# Patient Record
Sex: FEMALE | Race: WHITE | NOT HISPANIC OR LATINO | Employment: FULL TIME | URBAN - METROPOLITAN AREA
[De-identification: names, ages, dates, MRNs, and addresses within clinical notes are randomized per-mention and may not be internally consistent; named-entity substitution may affect disease eponyms.]

---

## 2023-11-15 ENCOUNTER — APPOINTMENT (OUTPATIENT)
Dept: GENERAL RADIOLOGY | Facility: CLINIC | Age: 23
End: 2023-11-15
Attending: EMERGENCY MEDICINE
Payer: COMMERCIAL

## 2023-11-15 ENCOUNTER — HOSPITAL ENCOUNTER (EMERGENCY)
Facility: CLINIC | Age: 23
Discharge: HOME | End: 2023-11-16
Attending: EMERGENCY MEDICINE | Admitting: EMERGENCY MEDICINE
Payer: COMMERCIAL

## 2023-11-15 DIAGNOSIS — S82.892A ANKLE FRACTURE, LEFT, CLOSED, INITIAL ENCOUNTER: ICD-10-CM

## 2023-11-15 LAB
ANION GAP SERPL CALCULATED.3IONS-SCNC: 14 MMOL/L (ref 7–15)
APTT PPP: 24 SECONDS (ref 22–38)
BASOPHILS # BLD AUTO: 0.1 10E3/UL (ref 0–0.2)
BASOPHILS NFR BLD AUTO: 1 %
BUN SERPL-MCNC: 11.4 MG/DL (ref 6–20)
CALCIUM SERPL-MCNC: 8.8 MG/DL (ref 8.6–10)
CHLORIDE SERPL-SCNC: 101 MMOL/L (ref 98–107)
CREAT SERPL-MCNC: 0.96 MG/DL (ref 0.51–0.95)
DEPRECATED HCO3 PLAS-SCNC: 24 MMOL/L (ref 22–29)
EGFRCR SERPLBLD CKD-EPI 2021: 85 ML/MIN/1.73M2
EOSINOPHIL # BLD AUTO: 0.2 10E3/UL (ref 0–0.7)
EOSINOPHIL NFR BLD AUTO: 2 %
ERYTHROCYTE [DISTWIDTH] IN BLOOD BY AUTOMATED COUNT: 12.1 % (ref 10–15)
GLUCOSE SERPL-MCNC: 87 MG/DL (ref 70–99)
HCT VFR BLD AUTO: 41.7 % (ref 35–47)
HGB BLD-MCNC: 13.6 G/DL (ref 11.7–15.7)
IMM GRANULOCYTES # BLD: 0 10E3/UL
IMM GRANULOCYTES NFR BLD: 0 %
INR PPP: 1.08 (ref 0.85–1.15)
LYMPHOCYTES # BLD AUTO: 3.8 10E3/UL (ref 0.8–5.3)
LYMPHOCYTES NFR BLD AUTO: 31 %
MCH RBC QN AUTO: 28.1 PG (ref 26.5–33)
MCHC RBC AUTO-ENTMCNC: 32.6 G/DL (ref 31.5–36.5)
MCV RBC AUTO: 86 FL (ref 78–100)
MONOCYTES # BLD AUTO: 1 10E3/UL (ref 0–1.3)
MONOCYTES NFR BLD AUTO: 8 %
NEUTROPHILS # BLD AUTO: 6.9 10E3/UL (ref 1.6–8.3)
NEUTROPHILS NFR BLD AUTO: 58 %
NRBC # BLD AUTO: 0 10E3/UL
NRBC BLD AUTO-RTO: 0 /100
PLATELET # BLD AUTO: 304 10E3/UL (ref 150–450)
POTASSIUM SERPL-SCNC: 4 MMOL/L (ref 3.4–5.3)
RBC # BLD AUTO: 4.84 10E6/UL (ref 3.8–5.2)
SODIUM SERPL-SCNC: 139 MMOL/L (ref 135–145)
WBC # BLD AUTO: 12 10E3/UL (ref 4–11)

## 2023-11-15 PROCEDURE — 96374 THER/PROPH/DIAG INJ IV PUSH: CPT | Mod: 59

## 2023-11-15 PROCEDURE — 85730 THROMBOPLASTIN TIME PARTIAL: CPT | Performed by: EMERGENCY MEDICINE

## 2023-11-15 PROCEDURE — 27762 CLTX MED ANKLE FX W/MNPJ: CPT

## 2023-11-15 PROCEDURE — 99285 EMERGENCY DEPT VISIT HI MDM: CPT | Mod: 25

## 2023-11-15 PROCEDURE — 73600 X-RAY EXAM OF ANKLE: CPT | Mod: LT

## 2023-11-15 PROCEDURE — 73560 X-RAY EXAM OF KNEE 1 OR 2: CPT | Mod: LT

## 2023-11-15 PROCEDURE — 999N000180 XR SURGERY CARM FLUORO LESS THAN 5 MIN: Mod: TC

## 2023-11-15 PROCEDURE — 85610 PROTHROMBIN TIME: CPT | Performed by: EMERGENCY MEDICINE

## 2023-11-15 PROCEDURE — 96375 TX/PRO/DX INJ NEW DRUG ADDON: CPT | Mod: XU

## 2023-11-15 PROCEDURE — 36415 COLL VENOUS BLD VENIPUNCTURE: CPT | Performed by: EMERGENCY MEDICINE

## 2023-11-15 PROCEDURE — 250N000011 HC RX IP 250 OP 636: Performed by: EMERGENCY MEDICINE

## 2023-11-15 PROCEDURE — 85025 COMPLETE CBC W/AUTO DIFF WBC: CPT | Performed by: EMERGENCY MEDICINE

## 2023-11-15 PROCEDURE — 82374 ASSAY BLOOD CARBON DIOXIDE: CPT | Performed by: EMERGENCY MEDICINE

## 2023-11-15 RX ORDER — PROPOFOL 10 MG/ML
INJECTION, EMULSION INTRAVENOUS
Status: DISCONTINUED
Start: 2023-11-15 | End: 2023-11-16 | Stop reason: WASHOUT

## 2023-11-15 RX ORDER — FLUMAZENIL 0.1 MG/ML
0.2 INJECTION, SOLUTION INTRAVENOUS
Status: DISCONTINUED | OUTPATIENT
Start: 2023-11-15 | End: 2023-11-16 | Stop reason: HOSPADM

## 2023-11-15 RX ORDER — PROPOFOL 10 MG/ML
100 INJECTION, EMULSION INTRAVENOUS ONCE
Status: COMPLETED | OUTPATIENT
Start: 2023-11-15 | End: 2023-11-16

## 2023-11-15 RX ORDER — MORPHINE SULFATE 4 MG/ML
4 INJECTION, SOLUTION INTRAMUSCULAR; INTRAVENOUS ONCE
Status: COMPLETED | OUTPATIENT
Start: 2023-11-15 | End: 2023-11-15

## 2023-11-15 RX ADMIN — MORPHINE SULFATE 4 MG: 4 INJECTION, SOLUTION INTRAMUSCULAR; INTRAVENOUS at 22:46

## 2023-11-15 ASSESSMENT — ACTIVITIES OF DAILY LIVING (ADL): ADLS_ACUITY_SCORE: 35

## 2023-11-16 ENCOUNTER — APPOINTMENT (OUTPATIENT)
Dept: GENERAL RADIOLOGY | Facility: CLINIC | Age: 23
End: 2023-11-16
Attending: EMERGENCY MEDICINE
Payer: COMMERCIAL

## 2023-11-16 VITALS
DIASTOLIC BLOOD PRESSURE: 71 MMHG | TEMPERATURE: 98 F | OXYGEN SATURATION: 100 % | WEIGHT: 293 LBS | SYSTOLIC BLOOD PRESSURE: 123 MMHG | RESPIRATION RATE: 11 BRPM | HEART RATE: 98 BPM

## 2023-11-16 PROCEDURE — 999N000055 HC STATISTIC END TITIAL CO2 MONITORING

## 2023-11-16 PROCEDURE — 96376 TX/PRO/DX INJ SAME DRUG ADON: CPT

## 2023-11-16 PROCEDURE — 999N000065 XR ANKLE LEFT 2 VIEWS: Mod: LT

## 2023-11-16 PROCEDURE — 999N000157 HC STATISTIC RCP TIME EA 10 MIN

## 2023-11-16 PROCEDURE — 250N000011 HC RX IP 250 OP 636: Performed by: EMERGENCY MEDICINE

## 2023-11-16 RX ORDER — IBUPROFEN 800 MG/1
800 TABLET, FILM COATED ORAL EVERY 8 HOURS PRN
Qty: 30 TABLET | Refills: 0 | Status: SHIPPED | OUTPATIENT
Start: 2023-11-16 | End: 2023-11-24

## 2023-11-16 RX ORDER — MORPHINE SULFATE 4 MG/ML
4 INJECTION, SOLUTION INTRAMUSCULAR; INTRAVENOUS ONCE
Status: COMPLETED | OUTPATIENT
Start: 2023-11-16 | End: 2023-11-16

## 2023-11-16 RX ORDER — HYDROCODONE BITARTRATE AND ACETAMINOPHEN 5; 325 MG/1; MG/1
1 TABLET ORAL EVERY 6 HOURS PRN
Qty: 10 TABLET | Refills: 0 | Status: SHIPPED | OUTPATIENT
Start: 2023-11-16 | End: 2023-11-19

## 2023-11-16 RX ORDER — METHOCARBAMOL 500 MG/1
500 TABLET, FILM COATED ORAL 3 TIMES DAILY
Qty: 21 TABLET | Refills: 0 | Status: ON HOLD | OUTPATIENT
Start: 2023-11-16 | End: 2023-12-01

## 2023-11-16 RX ADMIN — PROPOFOL 200 MG: 10 INJECTION, EMULSION INTRAVENOUS at 00:07

## 2023-11-16 RX ADMIN — MORPHINE SULFATE 4 MG: 4 INJECTION, SOLUTION INTRAMUSCULAR; INTRAVENOUS at 00:20

## 2023-11-16 ASSESSMENT — ACTIVITIES OF DAILY LIVING (ADL): ADLS_ACUITY_SCORE: 35

## 2023-11-16 NOTE — SEDATION DOCUMENTATION
Patient tolerated procedure fairly well, alert and orientated now, SO at bedside, CMS intact, Primary nurse updated

## 2023-11-16 NOTE — PROGRESS NOTES
An ETCO2 monitor was placed on the pt with 2LPM bled in. The Ambu bag, suction, and airways were setup and present in the room, but not needed. Pt was able to maintain airway throughout the procedure with no intervention needed. ETCO2 levels were maintained between 37-42.    RT Yessenia on 11/16/2023 at 12:31 AM

## 2023-11-16 NOTE — ED TRIAGE NOTES
Patient presents to ED  via EMS. Per EMS report patient was roller skating fell and rolled and landed L ankle. Denies numbness to L food. Pulses intact. Deformity noted   Triage Assessment (Adult)       Row Name 11/15/23 3468          Triage Assessment    Airway WDL WDL        Respiratory WDL    Respiratory WDL WDL        Peripheral/Neurovascular WDL    Peripheral Neurovascular WDL WDL

## 2023-11-16 NOTE — DISCHARGE INSTRUCTIONS
Take the medications as prescribed    Elevate your leg to help decrease swelling.    With any extremity fracture there is risk for something called compartment syndrome.  If you develop any pain out of proportion, numbness, tingling to your foot/leg, any color change to the foot or any concerning symptoms please return to the ER immediately.    Please follow-up with orthopedic surgery go to the [facility] office tomorrow.    Return to the ER for any worsening or concerning symptoms.

## 2023-11-16 NOTE — ED NOTES
Pt is tolerating with ambulation. Crutches education was performed by EDT. Pt demonstrated understand with teach back. Pt VSS, will be discharge home per MD plan.

## 2023-11-16 NOTE — ED PROVIDER NOTES
History     Chief Complaint:  Trauma       HPI   Aranza Garibay is a 23 year old female with left ankle pain.  Patient states she was rollerblading prior to arrival, fell twisting her ankle.  She is found to have an obvious deformity to the right ankle with external rotation.  She was brought in by EMS given 100 mcg of fentanyl.  States that the pain is well controlled.  States she last ate approximately 4 hours ago.  She reports no head injury.  Reports no neck pain or back pain.  Denies any left knee or hip pain.  She states on the medial left ankle she has a old wound from her shoes rubbing on her ankle.  States that the wound did not occur from the injuries today.      Independent Historian:   None - Patient Only    Review of External Notes:      Limited past medical records for review.    Medications:    HYDROcodone-acetaminophen (NORCO) 5-325 MG tablet  ibuprofen (ADVIL/MOTRIN) 800 MG tablet  methocarbamol (ROBAXIN) 500 MG tablet        Past Medical History:    No past medical history on file.    Past Surgical History:    No past surgical history on file.     Physical Exam   Patient Vitals for the past 24 hrs:   BP Temp Temp src Pulse Resp SpO2 Weight   11/16/23 0140 123/71 -- -- 98 11 100 % --   11/16/23 0130 113/60 -- -- 101 10 99 % --   11/16/23 0115 120/79 -- -- 99 (!) 7 99 % --   11/16/23 0100 118/79 -- -- 111 14 99 % --   11/16/23 0045 -- -- -- 99 15 100 % --   11/16/23 0015 113/77 -- -- 97 (!) 0 100 % --   11/16/23 0010 110/66 -- -- 91 (!) 8 99 % --   11/16/23 0006 (!) 115/101 -- -- 89 11 100 % --   11/16/23 0001 114/66 -- -- 98 (!) 9 99 % --   11/15/23 2359 -- -- -- 98 11 100 % --   11/15/23 2356 113/76 -- -- 108 (!) 8 100 % --   11/15/23 2351 (!) 112/103 -- -- 102 11 100 % --   11/15/23 2246 -- 98  F (36.7  C) -- -- -- -- --   11/15/23 2211 -- -- -- -- -- -- 141.5 kg (312 lb)   11/15/23 2210 (!) 138/95 -- Oral 78 20 98 % --        Physical Exam  Vitals reviewed.   Constitutional:       General: She  is not in acute distress.     Appearance: She is not ill-appearing.   HENT:      Head: Normocephalic and atraumatic.   Eyes:      Extraocular Movements: Extraocular movements intact.   Cardiovascular:      Rate and Rhythm: Normal rate and regular rhythm.   Pulmonary:      Effort: Pulmonary effort is normal. No respiratory distress.      Breath sounds: Normal breath sounds. No wheezing.   Abdominal:      Palpations: Abdomen is soft.      Tenderness: There is no abdominal tenderness. There is no guarding.   Musculoskeletal:      Cervical back: Normal range of motion. No rigidity or tenderness.      Comments: Left ankle: Obvious bony deformity and tenderness externally rotated with swelling.  No open wounds.  Medial malleolus with small abrasion.  2+ DP pulse.    No left knee tenderness or swelling.    Pelvis stable   Skin:     General: Skin is warm and dry.   Neurological:      Mental Status: She is alert and oriented to person, place, and time.      GCS: GCS eye subscore is 4. GCS verbal subscore is 5. GCS motor subscore is 6.   Psychiatric:         Behavior: Behavior normal.           Emergency Department Course     Imaging:  XR Ankle Left 2 Views   Final Result   IMPRESSION:      Interval placement of an overlying splint, which limits evaluation of fine osseous detail.      Interval reduction of the medial malleolus and posterior malleolus fractures, in near anatomic alignment. Persistent mild asymmetry of the ankle mortise.      XR Surgery ZOHREH L/T 5 Min Fluoro   Final Result      XR Knee Left 1/2 Views   Final Result   IMPRESSION: Anatomic alignment of the left knee joint. No fracture, dislocation, effusion or calcified loose bodies. No x-ray evidence of avascular necrosis.      XR Ankle Left 2 Views   Final Result   IMPRESSION: Acute fractures of the posterior and medial malleoli with significant lateral displacement of the distal fracture fragments relative to the proximal tibia. Lateral malleolus is intact.  Lateral dislocation of the talar dome relative to the    tibial plafond. Soft tissue swelling about the ankle.             Laboratory:  Labs Ordered and Resulted from Time of ED Arrival to Time of ED Departure   BASIC METABOLIC PANEL - Abnormal       Result Value    Sodium 139      Potassium 4.0      Chloride 101      Carbon Dioxide (CO2) 24      Anion Gap 14      Urea Nitrogen 11.4      Creatinine 0.96 (*)     GFR Estimate 85      Calcium 8.8      Glucose 87     CBC WITH PLATELETS AND DIFFERENTIAL - Abnormal    WBC Count 12.0 (*)     RBC Count 4.84      Hemoglobin 13.6      Hematocrit 41.7      MCV 86      MCH 28.1      MCHC 32.6      RDW 12.1      Platelet Count 304      % Neutrophils 58      % Lymphocytes 31      % Monocytes 8      % Eosinophils 2      % Basophils 1      % Immature Granulocytes 0      NRBCs per 100 WBC 0      Absolute Neutrophils 6.9      Absolute Lymphocytes 3.8      Absolute Monocytes 1.0      Absolute Eosinophils 0.2      Absolute Basophils 0.1      Absolute Immature Granulocytes 0.0      Absolute NRBCs 0.0     INR - Normal    INR 1.08     PARTIAL THROMBOPLASTIN TIME - Normal    aPTT 24          [facility]    Procedure: Sedation    Date/Time: 11/16/2023 2:01 AM    Performed by: Daija Dumont DO  Authorized by: Daija Dumont DO    Risks, benefits and alternatives discussed.    ED EVALUATION:      Assessment Time: 11/15/2023 11:00 PM      I have performed an Emergency Department Evaluation including taking a history and physical examination, this evaluation will be documented in the electronic medical record for this ED encounter.      ASA Class: Class 2- mild systemic disease, no acute problems, no functional limitations    Mallampati: Grade 1- soft palate, uvula, tonsillar pillars, and posterior pharyngeal wall visible    NPO Status: appropriately NPO for procedure    UNIVERSAL PROTOCOL   Site Marked: Yes  Prior Images Obtained and Reviewed:  Yes  Required items:  Required blood products, implants, devices and special equipment available    Patient identity confirmed:  Verbally with patient, arm band and hospital-assigned identification number  Patient was reevaluated immediately before administering moderate or deep sedation or anesthesia  Confirmation Checklist:  Patient's identity using two indicators, relevant allergies, procedure was appropriate and matched the consent or emergent situation and correct equipment/implants were available  Time out: Immediately prior to the procedure a time out was called    Universal Protocol: the Joint Commission Universal Protocol was followed      SEDATION  Patient Sedated: Yes    Sedation Type:  Moderate (conscious) sedation  Sedation:  Propofol  Vital signs: Vital signs monitored during sedation      PROCEDURE    Patient Tolerance:  Patient tolerated the procedure well with no immediate complications  Length of time physician/provider present for 1:1 monitoring during sedation: 10  [facility]    -Fracture    Date/Time: 11/16/2023 2:04 AM    Performed by: Daija Dumont DO  Authorized by: Daija Dumont DO    Risks, benefits and alternatives discussed.      INJURY      Injury location:  Ankle    Ankle injury location:  L ankle    Ankle fracture type: medial malleolus      Ankle fracture type comment:  Posterior and Medial Malleolus    PRE PROCEDURE ASSESSMENT      Neurological function: normal      Distal perfusion: normal      Range of motion: reduced      ANESTHESIA (see MAR for exact dosages)      Anesthesia method:  None    PROCEDURE DETAILS:     Manipulation performed: yes      Skin traction used: no      Skeletal traction used: yes      Pin inserted: no      Reduction successful: yes      X-ray confirmed reduction: yes      Immobilization:  Splint    Splint type:  Ankle stirrup and short leg    Supplies used:  Ortho-Glass, cotton padding and elastic bandage    POST PROCEDURE ASSESSMENT      Neurological  function: normal      Distal perfusion: normal      Range of motion: unchanged        PROCEDURE    Patient Tolerance:  Patient tolerated the procedure well with no immediate complications       Emergency Department Course & Assessments:             Interventions:  Medications   flumazenil (ROMAZICON) injection 0.2 mg (has no administration in time range)   morphine (PF) injection 4 mg (4 mg Intravenous $Given 11/15/23 2246)   propofol (DIPRIVAN) injection 10 mg/mL vial (200 mg Intravenous $Given 11/16/23 0007)   morphine (PF) injection 4 mg (4 mg Intravenous $Given 11/16/23 0020)          Independent Interpretation (X-rays, CTs, rhythm strip):  XR reviewed Medial malleolus fracture    Consultations/Discussion of Management or Tests:  Orthopedic surgery: Dr. Ball   ED Course as of 11/16/23 0209   Wed Nov 15, 2023   2248 Viewed x-ray obvious deformity noted to the distal tibia with fracture dislocation.   2257 Updated patient on results.  Discussed plan for sedation and closed reduction.  I discussed the risks for benefits.  She verbalized understanding.   2318 Discussed case with Dr. Ball orthopedic on-call.  They recommending reduction and discharged home.   u Nov 16, 2023   0000 Closed reduction with procedural sedation performed.  Patient was given propofol.  C arm was at the bedside.  There appeared to be well alignment of the displaced fracture.  She had distal pulses present.  DP pulse was 2+.  A posterior  short leg with stirrup splint was placed.  Leg was elevated post reduction.  Postreduction films were ordered.   0050 I reviewed the x-ray it appears well aligned postreduction.  Reassessed the patient she is resting comfortably in the stretcher.   0127 Reassessed the patient.  She states she would like to get the x-rays now.  I went over her x-rays at length.  I discussed with her plan for close follow-up with Ortho.  Discussed at length with her signs to look out for including compartment syndrome.   I discussed with her strict return precautions for any pain out of proportion, color change to the foot, numbness, tingling, paresthesia or any concerning symptoms.  She states that as a child she did have compartment syndrome of her right upper extremity.  States that she is very aware of what compartment syndrome is.  She verbalized understanding of the return precautions.  Will road test with crutches at this time.   0200 Patient was able to use crutches without any difficulty.  I emphasized to patient that she needs to follow-up with Ortho.  Patient discharged home with pain medications including Robaxin, Norco, ibuprofen.  Discussed with her care instructions again regarding elevation of the left lower extremity.  Patient discharged home with her boyfriend.  She was able to tolerate p.o. at time of discharge.       Social Determinants of Health affecting care:   None    Disposition:  The patient was discharged to home.     Impression & Plan        Medical Decision Makin-year-old female presenting today with a left ankle injury.  She was rollerskating prior to arrival fell and twisted her ankle.  She reports no head injury.  She is noted to have obvious deformity to the left ankle.  Distal pulses are intact on arrival.  X-rays confirmed a medial and posterior malleoli are fracture on the left side.  Left knee x-ray showed no evidence of any fractures.  I discussed with orthopedic surgery on-call, Dr. Ball.  He states that patient can have close reduction in the ER, with close follow-up.  No need for admission or urgent surgery.  Patient was updated on results.  Discussed at length with her plans for sedation and closed reduction.  Patient consented to procedure, written consent was obtained.  Risk versus benefits were discussed at length with her.  Procedure as noted above.  Patient tolerated procedure without any difficulty.  She was able to tolerate p.o. post procedure.  She was placed in a stirrup,  posterior leg splint.  Post reduction she had palpable 2+ DP pulse.  There is no evidence of any open wounds, there is an old abrasion which patient states has been there for weeks.  I discussed at length with the patient elevating the left lower extremity while home.  I discussed strict return precautions at length with the patient multiple times.  Patient has had compartment syndrome in the past in the right upper extremity as a child.  Discussed tricked return precautions for any pain out of proportion, numbness or tingling, paresthesias to the leg, color change to the leg, foot or any worsening, concerning symptoms.  Patient had postreduction films that showed anatomical alignment of the ankle.  Patient was shown the pre and post reduction films.  I discussed with her plan for discharge home.  She was able to ambulate with crutches.  Patient is comfortable with discharge home.  She will follow-up with orthopedic surgery tomorrow.  All questions and concerns addressed at this time.       Diagnosis:    ICD-10-CM    1. Ankle fracture, left, closed, initial encounter  S82.892A            Discharge Medications:  Discharge Medication List as of 11/16/2023  1:48 AM        START taking these medications    Details   HYDROcodone-acetaminophen (NORCO) 5-325 MG tablet Take 1 tablet by mouth every 6 hours as needed for severe pain, Disp-10 tablet, R-0, Local Print      ibuprofen (ADVIL/MOTRIN) 800 MG tablet Take 1 tablet (800 mg) by mouth every 8 hours as needed for moderate pain, Disp-30 tablet, R-0, Local Print      methocarbamol (ROBAXIN) 500 MG tablet Take 1 tablet (500 mg) by mouth 3 times daily, Disp-21 tablet, R-0, Local Print                Daija Dumont DO  11/15/2023   Daija Dumont DO Doan, Tiffani, DO  11/16/23 0209

## 2023-11-20 NOTE — PROGRESS NOTES
[facility]  [address] [place] [address], [address]  [facility] [address]  Phone: [phone]  Primary Provider: Tia Fontana  Pre-op Performing Provider: FADI MAGANA      PREOPERATIVE EVALUATION:  Today's date: 11/21/2023    Aranza is a 23 year old, presenting for the following:  Pre-Op Exam      Surgical Information:  Surgery/Procedure: LEFT ANKLE PROCEDURE  Surgery Location: [facility]  Surgeon: TBD  Surgery Date: POSSIBLY 11/22/23   Time of Surgery: TBD  Where patient plans to recover: At home with family  Fax number for surgical facility: Hand a copy to patient   [place] location: [phone]  [place] location: [phone]    Assessment & Plan     The proposed surgical procedure is considered INTERMEDIATE risk.    Pre-op exam  Closed fracture of left ankle with routine healing, subsequent encounter  Cleared for surgery pending labs.  Check CBC and BMP.  Discussed discontinuation of ibuprofen ideally before procedure.  Continue all other medications without modification.  We will send H&P and labs to surgical team upon their return.  - CBC with platelets and differential  - Basic metabolic panel  (Ca, Cl, CO2, Creat, Gluc, K, Na, BUN)     - No identified additional risk factors other than previously addressed      RECOMMENDATION:  APPROVAL GIVEN to proceed with proposed procedure pending review of diagnostic evaluation.      20 minutes spent by me on the date of the encounter doing chart review, review of test results, interpretation of tests, patient visit, and documentation       Subjective       HPI related to upcoming procedure:     Here today for preoperative clearance for upcoming left ankle procedure.  Date has yet to be set but is expected in the next few days.  Suffered a left ankle fracture on 11/15/2023.  No issues with anesthesia in the past.  Allergy to amoxicillin.  No history of heart attack, stroke, or blood clot.  Otherwise feels well.  Currently managing pain with  Tylenol, ibuprofen, and Robaxin.  Well-controlled today.        11/21/2023     7:29 AM   Preop Questions   1. Have you ever had a heart attack or stroke? No   2. Have you ever had surgery on your heart or blood vessels, such as a stent placement, a coronary artery bypass, or surgery on an artery in your head, neck, heart, or legs? No   3. Do you have chest pain with activity? No   4. Do you have a history of  heart failure? No   5. Do you currently have a cold, bronchitis or symptoms of other infection? No   6. Do you have a cough, shortness of breath, or wheezing? No   7. Do you or anyone in your family have previous history of blood clots? YES - Dad with of blood clot   8. Do you or does anyone in your family have a serious bleeding problem such as prolonged bleeding following surgeries or cuts? No   9. Have you ever had problems with anemia or been told to take iron pills? No   10. Have you had any abnormal blood loss such as black, tarry or bloody stools, or abnormal vaginal bleeding? No   11. Have you ever had a blood transfusion? No   12. Are you willing to have a blood transfusion if it is medically needed before, during, or after your surgery? Yes   13. Have you or any of your relatives ever had problems with anesthesia? No   14. Do you have sleep apnea, excessive snoring or daytime drowsiness? No   15. Do you have any artifical heart valves or other implanted medical devices like a pacemaker, defibrillator, or continuous glucose monitor? No   16. Do you have artificial joints? No   17. Are you allergic to latex? No   18. Is there any chance that you may be pregnant? No     Health Care Directive:  Patient does not have a Health Care Directive or Living Will:     Preoperative Review of :   reviewed - no record of controlled substances prescribed.        Review of Systems  CONSTITUTIONAL: NEGATIVE for fever, chills, change in weight  INTEGUMENTARY/SKIN: NEGATIVE for worrisome rashes, moles or  "lesions  EYES: NEGATIVE for vision changes or irritation  ENT/MOUTH: NEGATIVE for ear, mouth and throat problems  RESP: NEGATIVE for significant cough or SOB  CV: NEGATIVE for chest pain, palpitations or peripheral edema  GI: NEGATIVE for nausea, abdominal pain, heartburn, or change in bowel habits  : NEGATIVE for frequency, dysuria, or hematuria  MUSCULOSKELETAL: NEGATIVE for significant arthralgias or myalgia  NEURO: NEGATIVE for weakness, dizziness or paresthesias  ENDOCRINE: NEGATIVE for temperature intolerance, skin/hair changes  HEME: NEGATIVE for bleeding problems  PSYCHIATRIC: NEGATIVE for changes in mood or affect    There are no problems to display for this patient.     No past medical history on file.  No past surgical history on file.  Current Outpatient Medications   Medication Sig Dispense Refill    acetaminophen (TYLENOL) 500 MG tablet Take 500-1,000 mg by mouth every 6 hours as needed for mild pain      buPROPion (WELLBUTRIN XL) 300 MG 24 hr tablet Take 1 tablet by mouth daily at 2 pm      ibuprofen (ADVIL/MOTRIN) 800 MG tablet Take 1 tablet (800 mg) by mouth every 8 hours as needed for moderate pain 30 tablet 0    methocarbamol (ROBAXIN) 500 MG tablet Take 1 tablet (500 mg) by mouth 3 times daily 21 tablet 0       Allergies   Allergen Reactions    Amoxicillin         Social History     Tobacco Use    Smoking status: Never    Smokeless tobacco: Never   Substance Use Topics    Alcohol use: Not on file     No family history on file.  History   Drug Use Not on file         Objective     /77 (BP Location: Right arm, Patient Position: Sitting, Cuff Size: Adult Regular)   Pulse 85   Temp 97.7  F (36.5  C) (Temporal)   Resp 15   Ht 1.6 m (5' 3\")   Wt 141.5 kg (312 lb)   LMP 11/07/2023 (Approximate)   SpO2 100%   BMI 55.27 kg/m      Physical Exam    GENERAL APPEARANCE: healthy, alert and no distress     EYES: EOMI,  PERRL     NECK: no adenopathy, no asymmetry, masses, or scars and thyroid " normal to palpation     RESP: lungs clear to auscultation - no rales, rhonchi or wheezes     CV: regular rates and rhythm, normal S1 S2, no S3 or S4 and no murmur, click or rub     ABDOMEN:  soft, nontender, no HSM or masses     MS: Left lower extremity with Ortho-Glass splint.  Toes with normal cap refill/sensation intact.     SKIN: no suspicious lesions or rashes     NEURO: Normal strength and tone, sensory exam grossly normal, mentation intact and speech normal     PSYCH: mentation appears normal. and affect normal/bright     LYMPHATICS: No cervical adenopathy    Recent Labs   Lab Test 11/15/23  2304   HGB 13.6      INR 1.08      POTASSIUM 4.0   CR 0.96*        Diagnostics:  Labs pending at this time.  Results will be reviewed when available.   No EKG required, no history of coronary heart disease, significant arrhythmia, peripheral arterial disease or other structural heart disease.    Revised Cardiac Risk Index (RCRI):  The patient has the following serious cardiovascular risks for perioperative complications:   - No serious cardiac risks = 0 points     RCRI Interpretation: 0 points: Class I (very low risk - 0.4% complication rate)    The likelihood of other entities in the differential is insufficient to justify any further testing for them at this time. This was explained to the patient. The patient was advised that persistent or worsening symptoms would require further evaluation. Patient advised to call the office and if unable to reach to go to the emergency room if they develop any new or worsening symptoms. Expressed understanding and agreement with above stated plan.     Signed Electronically by: Jose M Mari PA-C  Copy of this evaluation report is provided to requesting physician.

## 2023-11-21 ENCOUNTER — OFFICE VISIT (OUTPATIENT)
Dept: FAMILY MEDICINE | Facility: CLINIC | Age: 23
End: 2023-11-21
Payer: COMMERCIAL

## 2023-11-21 VITALS
HEIGHT: 63 IN | WEIGHT: 293 LBS | SYSTOLIC BLOOD PRESSURE: 121 MMHG | RESPIRATION RATE: 15 BRPM | TEMPERATURE: 97.7 F | HEART RATE: 85 BPM | BODY MASS INDEX: 51.91 KG/M2 | OXYGEN SATURATION: 100 % | DIASTOLIC BLOOD PRESSURE: 77 MMHG

## 2023-11-21 DIAGNOSIS — Z01.818 PRE-OP EXAM: Primary | ICD-10-CM

## 2023-11-21 DIAGNOSIS — S82.892D CLOSED FRACTURE OF LEFT ANKLE WITH ROUTINE HEALING, SUBSEQUENT ENCOUNTER: ICD-10-CM

## 2023-11-21 LAB
ANION GAP SERPL CALCULATED.3IONS-SCNC: 10 MMOL/L (ref 7–15)
BASOPHILS # BLD AUTO: 0 10E3/UL (ref 0–0.2)
BASOPHILS NFR BLD AUTO: 0 %
BUN SERPL-MCNC: 13 MG/DL (ref 6–20)
CALCIUM SERPL-MCNC: 9.3 MG/DL (ref 8.6–10)
CHLORIDE SERPL-SCNC: 103 MMOL/L (ref 98–107)
CREAT SERPL-MCNC: 0.8 MG/DL (ref 0.51–0.95)
DEPRECATED HCO3 PLAS-SCNC: 26 MMOL/L (ref 22–29)
EGFRCR SERPLBLD CKD-EPI 2021: >90 ML/MIN/1.73M2
EOSINOPHIL # BLD AUTO: 0.2 10E3/UL (ref 0–0.7)
EOSINOPHIL NFR BLD AUTO: 2 %
ERYTHROCYTE [DISTWIDTH] IN BLOOD BY AUTOMATED COUNT: 11.7 % (ref 10–15)
GLUCOSE SERPL-MCNC: 117 MG/DL (ref 70–99)
HCT VFR BLD AUTO: 44 % (ref 35–47)
HGB BLD-MCNC: 14 G/DL (ref 11.7–15.7)
IMM GRANULOCYTES # BLD: 0 10E3/UL
IMM GRANULOCYTES NFR BLD: 0 %
LYMPHOCYTES # BLD AUTO: 2.1 10E3/UL (ref 0.8–5.3)
LYMPHOCYTES NFR BLD AUTO: 25 %
MCH RBC QN AUTO: 27.5 PG (ref 26.5–33)
MCHC RBC AUTO-ENTMCNC: 31.8 G/DL (ref 31.5–36.5)
MCV RBC AUTO: 86 FL (ref 78–100)
MONOCYTES # BLD AUTO: 0.4 10E3/UL (ref 0–1.3)
MONOCYTES NFR BLD AUTO: 5 %
NEUTROPHILS # BLD AUTO: 5.7 10E3/UL (ref 1.6–8.3)
NEUTROPHILS NFR BLD AUTO: 68 %
PLATELET # BLD AUTO: 323 10E3/UL (ref 150–450)
POTASSIUM SERPL-SCNC: 4.6 MMOL/L (ref 3.4–5.3)
RBC # BLD AUTO: 5.1 10E6/UL (ref 3.8–5.2)
SODIUM SERPL-SCNC: 139 MMOL/L (ref 135–145)
WBC # BLD AUTO: 8.3 10E3/UL (ref 4–11)

## 2023-11-21 PROCEDURE — 85025 COMPLETE CBC W/AUTO DIFF WBC: CPT | Performed by: PHYSICIAN ASSISTANT

## 2023-11-21 PROCEDURE — 80048 BASIC METABOLIC PNL TOTAL CA: CPT | Performed by: PHYSICIAN ASSISTANT

## 2023-11-21 PROCEDURE — 36415 COLL VENOUS BLD VENIPUNCTURE: CPT | Performed by: PHYSICIAN ASSISTANT

## 2023-11-21 PROCEDURE — 99204 OFFICE O/P NEW MOD 45 MIN: CPT | Performed by: PHYSICIAN ASSISTANT

## 2023-11-21 RX ORDER — BUPROPION HYDROCHLORIDE 300 MG/1
1 TABLET ORAL
COMMUNITY
Start: 2023-09-05 | End: 2024-01-30

## 2023-11-21 RX ORDER — ACETAMINOPHEN 500 MG
500-1000 TABLET ORAL EVERY 6 HOURS PRN
COMMUNITY

## 2023-11-21 ASSESSMENT — PAIN SCALES - GENERAL: PAINLEVEL: NO PAIN (0)

## 2023-11-30 ENCOUNTER — ANESTHESIA EVENT (OUTPATIENT)
Dept: SURGERY | Facility: CLINIC | Age: 23
End: 2023-11-30
Payer: COMMERCIAL

## 2023-11-30 ENCOUNTER — HOSPITAL ENCOUNTER (OUTPATIENT)
Facility: CLINIC | Age: 23
Discharge: HOME | End: 2023-12-03
Attending: ORTHOPAEDIC SURGERY | Admitting: ORTHOPAEDIC SURGERY
Payer: COMMERCIAL

## 2023-11-30 ENCOUNTER — ANESTHESIA (OUTPATIENT)
Dept: SURGERY | Facility: CLINIC | Age: 23
End: 2023-11-30
Payer: COMMERCIAL

## 2023-11-30 ENCOUNTER — APPOINTMENT (OUTPATIENT)
Dept: GENERAL RADIOLOGY | Facility: CLINIC | Age: 23
End: 2023-11-30
Attending: ORTHOPAEDIC SURGERY
Payer: COMMERCIAL

## 2023-11-30 DIAGNOSIS — Z87.81 STATUS POST OPEN REDUCTION WITH INTERNAL FIXATION (ORIF) OF FRACTURE OF ANKLE: ICD-10-CM

## 2023-11-30 DIAGNOSIS — S82.842A ANKLE FRACTURE, BIMALLEOLAR, CLOSED, LEFT, INITIAL ENCOUNTER: Primary | ICD-10-CM

## 2023-11-30 DIAGNOSIS — Z98.890 STATUS POST OPEN REDUCTION WITH INTERNAL FIXATION (ORIF) OF FRACTURE OF ANKLE: ICD-10-CM

## 2023-11-30 LAB — HCG UR QL: NEGATIVE

## 2023-11-30 PROCEDURE — 258N000003 HC RX IP 258 OP 636: Performed by: NURSE ANESTHETIST, CERTIFIED REGISTERED

## 2023-11-30 PROCEDURE — 250N000009 HC RX 250: Performed by: NURSE ANESTHETIST, CERTIFIED REGISTERED

## 2023-11-30 PROCEDURE — 250N000011 HC RX IP 250 OP 636: Performed by: NURSE ANESTHETIST, CERTIFIED REGISTERED

## 2023-11-30 PROCEDURE — 258N000003 HC RX IP 258 OP 636: Performed by: ANESTHESIOLOGY

## 2023-11-30 PROCEDURE — 250N000009 HC RX 250: Performed by: ANESTHESIOLOGY

## 2023-11-30 PROCEDURE — 710N000010 HC RECOVERY PHASE 1, LEVEL 2, PER MIN: Performed by: ORTHOPAEDIC SURGERY

## 2023-11-30 PROCEDURE — 250N000011 HC RX IP 250 OP 636: Mod: JZ | Performed by: NURSE ANESTHETIST, CERTIFIED REGISTERED

## 2023-11-30 PROCEDURE — C1713 ANCHOR/SCREW BN/BN,TIS/BN: HCPCS | Performed by: ORTHOPAEDIC SURGERY

## 2023-11-30 PROCEDURE — 258N000003 HC RX IP 258 OP 636: Performed by: ORTHOPAEDIC SURGERY

## 2023-11-30 PROCEDURE — 360N000084 HC SURGERY LEVEL 4 W/ FLUORO, PER MIN: Performed by: ORTHOPAEDIC SURGERY

## 2023-11-30 PROCEDURE — 250N000011 HC RX IP 250 OP 636: Performed by: ORTHOPAEDIC SURGERY

## 2023-11-30 PROCEDURE — 370N000017 HC ANESTHESIA TECHNICAL FEE, PER MIN: Performed by: ORTHOPAEDIC SURGERY

## 2023-11-30 PROCEDURE — 999N000179 XR SURGERY CARM FLUORO LESS THAN 5 MIN W STILLS

## 2023-11-30 PROCEDURE — 250N000013 HC RX MED GY IP 250 OP 250 PS 637: Performed by: ORTHOPAEDIC SURGERY

## 2023-11-30 PROCEDURE — 250N000009 HC RX 250: Performed by: ORTHOPAEDIC SURGERY

## 2023-11-30 PROCEDURE — C1769 GUIDE WIRE: HCPCS | Performed by: ORTHOPAEDIC SURGERY

## 2023-11-30 PROCEDURE — 250N000011 HC RX IP 250 OP 636: Mod: JZ | Performed by: ANESTHESIOLOGY

## 2023-11-30 PROCEDURE — 272N000001 HC OR GENERAL SUPPLY STERILE: Performed by: ORTHOPAEDIC SURGERY

## 2023-11-30 PROCEDURE — 250N000025 HC SEVOFLURANE, PER MIN: Performed by: ORTHOPAEDIC SURGERY

## 2023-11-30 PROCEDURE — 81025 URINE PREGNANCY TEST: CPT | Performed by: ANESTHESIOLOGY

## 2023-11-30 PROCEDURE — 999N000141 HC STATISTIC PRE-PROCEDURE NURSING ASSESSMENT: Performed by: ORTHOPAEDIC SURGERY

## 2023-11-30 DEVICE — IMPLANTABLE DEVICE: Type: IMPLANTABLE DEVICE | Site: ANKLE | Status: FUNCTIONAL

## 2023-11-30 DEVICE — IMP SCR SYN CAN 4.0X40MM LONG THRD SS 207.740: Type: IMPLANTABLE DEVICE | Site: ANKLE | Status: FUNCTIONAL

## 2023-11-30 DEVICE — K-LESS T-ROPE W/DRV, SYN REPR, SS
Type: IMPLANTABLE DEVICE | Site: ANKLE | Status: FUNCTIONAL
Brand: ARTHREX®

## 2023-11-30 DEVICE — IMP WIRE KIRSCHNER 1.25X150MM 292.12: Type: IMPLANTABLE DEVICE | Site: ANKLE | Status: FUNCTIONAL

## 2023-11-30 RX ORDER — HYDROMORPHONE HCL IN WATER/PF 6 MG/30 ML
0.2 PATIENT CONTROLLED ANALGESIA SYRINGE INTRAVENOUS
Status: DISCONTINUED | OUTPATIENT
Start: 2023-11-30 | End: 2023-12-03 | Stop reason: HOSPADM

## 2023-11-30 RX ORDER — ASPIRIN 81 MG/1
81 TABLET ORAL 2 TIMES DAILY
Qty: 60 TABLET | Refills: 0 | Status: SHIPPED | OUTPATIENT
Start: 2023-11-30 | End: 2024-01-30

## 2023-11-30 RX ORDER — NALOXONE HYDROCHLORIDE 0.4 MG/ML
0.2 INJECTION, SOLUTION INTRAMUSCULAR; INTRAVENOUS; SUBCUTANEOUS
Status: DISCONTINUED | OUTPATIENT
Start: 2023-11-30 | End: 2023-12-03 | Stop reason: HOSPADM

## 2023-11-30 RX ORDER — FENTANYL CITRATE 50 UG/ML
INJECTION, SOLUTION INTRAMUSCULAR; INTRAVENOUS PRN
Status: DISCONTINUED | OUTPATIENT
Start: 2023-11-30 | End: 2023-11-30

## 2023-11-30 RX ORDER — NALOXONE HYDROCHLORIDE 0.4 MG/ML
0.4 INJECTION, SOLUTION INTRAMUSCULAR; INTRAVENOUS; SUBCUTANEOUS
Status: DISCONTINUED | OUTPATIENT
Start: 2023-11-30 | End: 2023-12-03 | Stop reason: HOSPADM

## 2023-11-30 RX ORDER — ONDANSETRON 2 MG/ML
4 INJECTION INTRAMUSCULAR; INTRAVENOUS EVERY 30 MIN PRN
Status: DISCONTINUED | OUTPATIENT
Start: 2023-11-30 | End: 2023-11-30 | Stop reason: HOSPADM

## 2023-11-30 RX ORDER — MAGNESIUM HYDROXIDE 1200 MG/15ML
LIQUID ORAL PRN
Status: DISCONTINUED | OUTPATIENT
Start: 2023-11-30 | End: 2023-11-30 | Stop reason: HOSPADM

## 2023-11-30 RX ORDER — OXYCODONE HYDROCHLORIDE 5 MG/1
5 TABLET ORAL EVERY 4 HOURS PRN
Status: DISCONTINUED | OUTPATIENT
Start: 2023-11-30 | End: 2023-12-01

## 2023-11-30 RX ORDER — CEFAZOLIN SODIUM/WATER 3 G/30 ML
3 SYRINGE (ML) INTRAVENOUS
Status: COMPLETED | OUTPATIENT
Start: 2023-11-30 | End: 2023-11-30

## 2023-11-30 RX ORDER — ACETAMINOPHEN 325 MG/1
975 TABLET ORAL EVERY 8 HOURS
Qty: 27 TABLET | Refills: 0 | Status: DISCONTINUED | OUTPATIENT
Start: 2023-11-30 | End: 2023-12-03 | Stop reason: HOSPADM

## 2023-11-30 RX ORDER — PROCHLORPERAZINE MALEATE 10 MG
10 TABLET ORAL EVERY 6 HOURS PRN
Status: DISCONTINUED | OUTPATIENT
Start: 2023-11-30 | End: 2023-12-03 | Stop reason: HOSPADM

## 2023-11-30 RX ORDER — LIDOCAINE 40 MG/G
CREAM TOPICAL
Status: DISCONTINUED | OUTPATIENT
Start: 2023-11-30 | End: 2023-12-03 | Stop reason: HOSPADM

## 2023-11-30 RX ORDER — DEXMEDETOMIDINE HYDROCHLORIDE 4 UG/ML
INJECTION, SOLUTION INTRAVENOUS PRN
Status: DISCONTINUED | OUTPATIENT
Start: 2023-11-30 | End: 2023-11-30

## 2023-11-30 RX ORDER — AMOXICILLIN 250 MG
1 CAPSULE ORAL 2 TIMES DAILY
Status: DISCONTINUED | OUTPATIENT
Start: 2023-11-30 | End: 2023-12-03 | Stop reason: HOSPADM

## 2023-11-30 RX ORDER — OXYCODONE HYDROCHLORIDE 5 MG/1
5-10 TABLET ORAL EVERY 4 HOURS PRN
Qty: 20 TABLET | Refills: 0 | Status: SHIPPED | OUTPATIENT
Start: 2023-11-30 | End: 2023-12-01

## 2023-11-30 RX ORDER — TRANEXAMIC ACID 650 MG/1
1950 TABLET ORAL ONCE
Status: COMPLETED | OUTPATIENT
Start: 2023-11-30 | End: 2023-11-30

## 2023-11-30 RX ORDER — ACETAMINOPHEN 325 MG/1
650 TABLET ORAL EVERY 4 HOURS PRN
Qty: 100 TABLET | Refills: 0 | Status: SHIPPED | OUTPATIENT
Start: 2023-11-30 | End: 2024-01-30

## 2023-11-30 RX ORDER — SODIUM CHLORIDE, SODIUM LACTATE, POTASSIUM CHLORIDE, CALCIUM CHLORIDE 600; 310; 30; 20 MG/100ML; MG/100ML; MG/100ML; MG/100ML
INJECTION, SOLUTION INTRAVENOUS CONTINUOUS
Status: DISCONTINUED | OUTPATIENT
Start: 2023-11-30 | End: 2023-12-03 | Stop reason: HOSPADM

## 2023-11-30 RX ORDER — CEFAZOLIN SODIUM 2 G/100ML
2 INJECTION, SOLUTION INTRAVENOUS EVERY 8 HOURS
Qty: 40 ML | Refills: 0 | Status: COMPLETED | OUTPATIENT
Start: 2023-12-01 | End: 2023-12-01

## 2023-11-30 RX ORDER — HYDROMORPHONE HCL IN WATER/PF 6 MG/30 ML
0.4 PATIENT CONTROLLED ANALGESIA SYRINGE INTRAVENOUS EVERY 5 MIN PRN
Status: DISCONTINUED | OUTPATIENT
Start: 2023-11-30 | End: 2023-11-30 | Stop reason: HOSPADM

## 2023-11-30 RX ORDER — SODIUM CHLORIDE, SODIUM LACTATE, POTASSIUM CHLORIDE, CALCIUM CHLORIDE 600; 310; 30; 20 MG/100ML; MG/100ML; MG/100ML; MG/100ML
INJECTION, SOLUTION INTRAVENOUS CONTINUOUS
Status: DISCONTINUED | OUTPATIENT
Start: 2023-11-30 | End: 2023-11-30 | Stop reason: HOSPADM

## 2023-11-30 RX ORDER — HYDROCODONE BITARTRATE AND ACETAMINOPHEN 5; 325 MG/1; MG/1
1 TABLET ORAL EVERY 6 HOURS PRN
COMMUNITY
End: 2024-01-30

## 2023-11-30 RX ORDER — ACETAMINOPHEN 325 MG/1
975 TABLET ORAL ONCE
Status: COMPLETED | OUTPATIENT
Start: 2023-11-30 | End: 2023-11-30

## 2023-11-30 RX ORDER — HYDROMORPHONE HCL IN WATER/PF 6 MG/30 ML
0.2 PATIENT CONTROLLED ANALGESIA SYRINGE INTRAVENOUS EVERY 5 MIN PRN
Status: DISCONTINUED | OUTPATIENT
Start: 2023-11-30 | End: 2023-11-30 | Stop reason: HOSPADM

## 2023-11-30 RX ORDER — DEXAMETHASONE SODIUM PHOSPHATE 4 MG/ML
INJECTION, SOLUTION INTRA-ARTICULAR; INTRALESIONAL; INTRAMUSCULAR; INTRAVENOUS; SOFT TISSUE PRN
Status: DISCONTINUED | OUTPATIENT
Start: 2023-11-30 | End: 2023-11-30

## 2023-11-30 RX ORDER — BISACODYL 10 MG
10 SUPPOSITORY, RECTAL RECTAL DAILY PRN
Status: DISCONTINUED | OUTPATIENT
Start: 2023-11-30 | End: 2023-12-03 | Stop reason: HOSPADM

## 2023-11-30 RX ORDER — KETOROLAC TROMETHAMINE 15 MG/ML
15 INJECTION, SOLUTION INTRAMUSCULAR; INTRAVENOUS EVERY 6 HOURS
Qty: 4 ML | Refills: 0 | Status: COMPLETED | OUTPATIENT
Start: 2023-12-01 | End: 2023-12-01

## 2023-11-30 RX ORDER — FENTANYL CITRATE 50 UG/ML
50 INJECTION, SOLUTION INTRAMUSCULAR; INTRAVENOUS EVERY 5 MIN PRN
Status: DISCONTINUED | OUTPATIENT
Start: 2023-11-30 | End: 2023-11-30 | Stop reason: HOSPADM

## 2023-11-30 RX ORDER — CEFAZOLIN SODIUM/WATER 3 G/30 ML
3 SYRINGE (ML) INTRAVENOUS SEE ADMIN INSTRUCTIONS
Status: DISCONTINUED | OUTPATIENT
Start: 2023-11-30 | End: 2023-11-30 | Stop reason: HOSPADM

## 2023-11-30 RX ORDER — ONDANSETRON 2 MG/ML
4 INJECTION INTRAMUSCULAR; INTRAVENOUS EVERY 6 HOURS PRN
Status: DISCONTINUED | OUTPATIENT
Start: 2023-11-30 | End: 2023-12-03 | Stop reason: HOSPADM

## 2023-11-30 RX ORDER — ONDANSETRON 2 MG/ML
INJECTION INTRAMUSCULAR; INTRAVENOUS PRN
Status: DISCONTINUED | OUTPATIENT
Start: 2023-11-30 | End: 2023-11-30

## 2023-11-30 RX ORDER — POLYETHYLENE GLYCOL 3350 17 G/17G
1 POWDER, FOR SOLUTION ORAL DAILY
COMMUNITY
Start: 2023-11-30 | End: 2024-04-09

## 2023-11-30 RX ORDER — PROPOFOL 10 MG/ML
INJECTION, EMULSION INTRAVENOUS PRN
Status: DISCONTINUED | OUTPATIENT
Start: 2023-11-30 | End: 2023-11-30

## 2023-11-30 RX ORDER — ONDANSETRON 4 MG/1
4 TABLET, ORALLY DISINTEGRATING ORAL EVERY 30 MIN PRN
Status: DISCONTINUED | OUTPATIENT
Start: 2023-11-30 | End: 2023-11-30 | Stop reason: HOSPADM

## 2023-11-30 RX ORDER — KETOROLAC TROMETHAMINE 30 MG/ML
30 INJECTION, SOLUTION INTRAMUSCULAR; INTRAVENOUS ONCE
Status: COMPLETED | OUTPATIENT
Start: 2023-11-30 | End: 2023-11-30

## 2023-11-30 RX ORDER — ONDANSETRON 4 MG/1
4 TABLET, ORALLY DISINTEGRATING ORAL EVERY 6 HOURS PRN
Status: DISCONTINUED | OUTPATIENT
Start: 2023-11-30 | End: 2023-12-03 | Stop reason: HOSPADM

## 2023-11-30 RX ORDER — FENTANYL CITRATE 50 UG/ML
25 INJECTION, SOLUTION INTRAMUSCULAR; INTRAVENOUS EVERY 5 MIN PRN
Status: DISCONTINUED | OUTPATIENT
Start: 2023-11-30 | End: 2023-11-30 | Stop reason: HOSPADM

## 2023-11-30 RX ORDER — ASPIRIN 81 MG/1
81 TABLET ORAL 2 TIMES DAILY
Status: DISCONTINUED | OUTPATIENT
Start: 2023-11-30 | End: 2023-12-03 | Stop reason: HOSPADM

## 2023-11-30 RX ORDER — AMOXICILLIN 250 MG
1-2 CAPSULE ORAL 2 TIMES DAILY
COMMUNITY
Start: 2023-11-30 | End: 2024-04-09

## 2023-11-30 RX ORDER — ACETAMINOPHEN 325 MG/1
650 TABLET ORAL EVERY 4 HOURS PRN
Status: DISCONTINUED | OUTPATIENT
Start: 2023-12-03 | End: 2023-12-03 | Stop reason: HOSPADM

## 2023-11-30 RX ORDER — POLYETHYLENE GLYCOL 3350 17 G/17G
17 POWDER, FOR SOLUTION ORAL DAILY
Status: DISCONTINUED | OUTPATIENT
Start: 2023-12-01 | End: 2023-12-03 | Stop reason: HOSPADM

## 2023-11-30 RX ORDER — HYDROMORPHONE HCL IN WATER/PF 6 MG/30 ML
0.4 PATIENT CONTROLLED ANALGESIA SYRINGE INTRAVENOUS
Status: DISCONTINUED | OUTPATIENT
Start: 2023-11-30 | End: 2023-12-03 | Stop reason: HOSPADM

## 2023-11-30 RX ORDER — LIDOCAINE HYDROCHLORIDE 20 MG/ML
INJECTION, SOLUTION INFILTRATION; PERINEURAL PRN
Status: DISCONTINUED | OUTPATIENT
Start: 2023-11-30 | End: 2023-11-30

## 2023-11-30 RX ORDER — OXYCODONE HYDROCHLORIDE 5 MG/1
10 TABLET ORAL EVERY 4 HOURS PRN
Status: DISCONTINUED | OUTPATIENT
Start: 2023-11-30 | End: 2023-12-01

## 2023-11-30 RX ORDER — FENTANYL CITRATE 0.05 MG/ML
50 INJECTION, SOLUTION INTRAMUSCULAR; INTRAVENOUS
Status: DISCONTINUED | OUTPATIENT
Start: 2023-11-30 | End: 2023-11-30 | Stop reason: HOSPADM

## 2023-11-30 RX ORDER — IBUPROFEN 800 MG/1
800 TABLET, FILM COATED ORAL EVERY 8 HOURS PRN
Status: ON HOLD | COMMUNITY
End: 2023-12-01

## 2023-11-30 RX ADMIN — FENTANYL CITRATE 50 MCG: 50 INJECTION, SOLUTION INTRAMUSCULAR; INTRAVENOUS at 20:09

## 2023-11-30 RX ADMIN — HYDROMORPHONE HYDROCHLORIDE 0.5 MG: 1 INJECTION, SOLUTION INTRAMUSCULAR; INTRAVENOUS; SUBCUTANEOUS at 20:01

## 2023-11-30 RX ADMIN — SUGAMMADEX 200 MG: 100 INJECTION, SOLUTION INTRAVENOUS at 19:51

## 2023-11-30 RX ADMIN — LIDOCAINE HYDROCHLORIDE 100 MG: 20 INJECTION, SOLUTION INFILTRATION; PERINEURAL at 16:50

## 2023-11-30 RX ADMIN — PHENYLEPHRINE HYDROCHLORIDE 200 MCG: 10 INJECTION INTRAVENOUS at 18:35

## 2023-11-30 RX ADMIN — HYDROMORPHONE HYDROCHLORIDE 0.4 MG: 0.2 INJECTION, SOLUTION INTRAMUSCULAR; INTRAVENOUS; SUBCUTANEOUS at 20:43

## 2023-11-30 RX ADMIN — ASPIRIN 81 MG: 81 TABLET, COATED ORAL at 23:07

## 2023-11-30 RX ADMIN — HYDROMORPHONE HYDROCHLORIDE 0.5 MG: 1 INJECTION, SOLUTION INTRAMUSCULAR; INTRAVENOUS; SUBCUTANEOUS at 17:31

## 2023-11-30 RX ADMIN — PHENYLEPHRINE HYDROCHLORIDE 200 MCG: 10 INJECTION INTRAVENOUS at 18:44

## 2023-11-30 RX ADMIN — DEXAMETHASONE SODIUM PHOSPHATE 10 MG: 4 INJECTION, SOLUTION INTRA-ARTICULAR; INTRALESIONAL; INTRAMUSCULAR; INTRAVENOUS; SOFT TISSUE at 17:31

## 2023-11-30 RX ADMIN — MIDAZOLAM 2 MG: 1 INJECTION INTRAMUSCULAR; INTRAVENOUS at 16:45

## 2023-11-30 RX ADMIN — PHENYLEPHRINE HYDROCHLORIDE 200 MCG: 10 INJECTION INTRAVENOUS at 19:54

## 2023-11-30 RX ADMIN — ACETAMINOPHEN 975 MG: 325 TABLET, FILM COATED ORAL at 14:46

## 2023-11-30 RX ADMIN — SODIUM CHLORIDE, POTASSIUM CHLORIDE, SODIUM LACTATE AND CALCIUM CHLORIDE: 600; 310; 30; 20 INJECTION, SOLUTION INTRAVENOUS at 22:11

## 2023-11-30 RX ADMIN — KETOROLAC TROMETHAMINE 30 MG: 30 INJECTION, SOLUTION INTRAMUSCULAR; INTRAVENOUS at 20:55

## 2023-11-30 RX ADMIN — ONDANSETRON 4 MG: 2 INJECTION INTRAMUSCULAR; INTRAVENOUS at 17:31

## 2023-11-30 RX ADMIN — FENTANYL CITRATE 50 MCG: 50 INJECTION, SOLUTION INTRAMUSCULAR; INTRAVENOUS at 20:14

## 2023-11-30 RX ADMIN — HYDROMORPHONE HYDROCHLORIDE 0.5 MG: 1 INJECTION, SOLUTION INTRAMUSCULAR; INTRAVENOUS; SUBCUTANEOUS at 18:28

## 2023-11-30 RX ADMIN — ROCURONIUM BROMIDE 50 MG: 50 INJECTION, SOLUTION INTRAVENOUS at 16:50

## 2023-11-30 RX ADMIN — ROCURONIUM BROMIDE 20 MG: 50 INJECTION, SOLUTION INTRAVENOUS at 18:23

## 2023-11-30 RX ADMIN — PHENYLEPHRINE HYDROCHLORIDE 200 MCG: 10 INJECTION INTRAVENOUS at 18:17

## 2023-11-30 RX ADMIN — HYDROMORPHONE HYDROCHLORIDE 0.4 MG: 0.2 INJECTION, SOLUTION INTRAMUSCULAR; INTRAVENOUS; SUBCUTANEOUS at 21:20

## 2023-11-30 RX ADMIN — ONDANSETRON 4 MG: 2 INJECTION INTRAMUSCULAR; INTRAVENOUS at 19:50

## 2023-11-30 RX ADMIN — FENTANYL CITRATE 50 MCG: 50 INJECTION INTRAMUSCULAR; INTRAVENOUS at 18:02

## 2023-11-30 RX ADMIN — TRANEXAMIC ACID 1950 MG: 650 TABLET ORAL at 14:46

## 2023-11-30 RX ADMIN — FENTANYL CITRATE 50 MCG: 50 INJECTION, SOLUTION INTRAMUSCULAR; INTRAVENOUS at 20:55

## 2023-11-30 RX ADMIN — SODIUM CHLORIDE, POTASSIUM CHLORIDE, SODIUM LACTATE AND CALCIUM CHLORIDE: 600; 310; 30; 20 INJECTION, SOLUTION INTRAVENOUS at 14:48

## 2023-11-30 RX ADMIN — OXYCODONE HYDROCHLORIDE 10 MG: 5 TABLET ORAL at 23:07

## 2023-11-30 RX ADMIN — SODIUM CHLORIDE, POTASSIUM CHLORIDE, SODIUM LACTATE AND CALCIUM CHLORIDE: 600; 310; 30; 20 INJECTION, SOLUTION INTRAVENOUS at 19:33

## 2023-11-30 RX ADMIN — DEXMEDETOMIDINE HYDROCHLORIDE 12 MCG: 200 INJECTION INTRAVENOUS at 18:05

## 2023-11-30 RX ADMIN — SODIUM CHLORIDE, POTASSIUM CHLORIDE, SODIUM LACTATE AND CALCIUM CHLORIDE: 600; 310; 30; 20 INJECTION, SOLUTION INTRAVENOUS at 18:00

## 2023-11-30 RX ADMIN — FENTANYL CITRATE 50 MCG: 50 INJECTION INTRAMUSCULAR; INTRAVENOUS at 17:30

## 2023-11-30 RX ADMIN — Medication 10 MG: at 20:27

## 2023-11-30 RX ADMIN — FENTANYL CITRATE 100 MCG: 50 INJECTION INTRAMUSCULAR; INTRAVENOUS at 16:50

## 2023-11-30 RX ADMIN — MIDAZOLAM 2 MG: 1 INJECTION INTRAMUSCULAR; INTRAVENOUS at 20:20

## 2023-11-30 RX ADMIN — Medication 3 G: at 16:45

## 2023-11-30 RX ADMIN — ACETAMINOPHEN 975 MG: 325 TABLET, FILM COATED ORAL at 23:07

## 2023-11-30 RX ADMIN — PROPOFOL 300 MG: 10 INJECTION, EMULSION INTRAVENOUS at 16:50

## 2023-11-30 RX ADMIN — HYDROMORPHONE HYDROCHLORIDE 0.4 MG: 0.2 INJECTION, SOLUTION INTRAMUSCULAR; INTRAVENOUS; SUBCUTANEOUS at 20:19

## 2023-11-30 ASSESSMENT — ACTIVITIES OF DAILY LIVING (ADL)
ADLS_ACUITY_SCORE: 18

## 2023-11-30 NOTE — ANESTHESIA PROCEDURE NOTES
Airway       Patient location during procedure: OR       Procedure Start/Stop Times: 11/30/2023 4:53 PM  Staff -        Anesthesiologist:  Alan Contreras MD       CRNA: Mary Jenkins APRN CRNA       Performed By: CRNA  Consent for Airway        Urgency: elective  Indications and Patient Condition       Indications for airway management: carin-procedural       Induction type:intravenous       Mask difficulty assessment: 0 - not attempted    Final Airway Details       Final airway type: endotracheal airway       Successful airway: ETT - single  Endotracheal Airway Details        ETT size (mm): 7.0       Successful intubation technique: video laryngoscopy       VL Blade Size: Glidescope 4       Grade View of Cords: 1       Adjucts: stylet       Position: Right       Measured from: lips       Secured at (cm): 22       Bite block used: None    Post intubation assessment        Placement verified by: capnometry, equal breath sounds and chest rise        Number of attempts at approach: 1       Secured with: tape       Ease of procedure: easy       Dentition: Intact and Unchanged       Dental guard used and removed.    Medication(s) Administered   Medication Administration Time: 11/30/2023 4:53 PM

## 2023-12-01 ENCOUNTER — APPOINTMENT (OUTPATIENT)
Dept: PHYSICAL THERAPY | Facility: CLINIC | Age: 23
End: 2023-12-01
Attending: ORTHOPAEDIC SURGERY
Payer: COMMERCIAL

## 2023-12-01 LAB
FASTING STATUS PATIENT QL REPORTED: NO
GLUCOSE SERPL-MCNC: 119 MG/DL (ref 70–99)
HGB BLD-MCNC: 12.2 G/DL (ref 11.7–15.7)

## 2023-12-01 PROCEDURE — 97530 THERAPEUTIC ACTIVITIES: CPT | Mod: GP

## 2023-12-01 PROCEDURE — 250N000013 HC RX MED GY IP 250 OP 250 PS 637: Performed by: PHYSICIAN ASSISTANT

## 2023-12-01 PROCEDURE — 85018 HEMOGLOBIN: CPT | Performed by: ORTHOPAEDIC SURGERY

## 2023-12-01 PROCEDURE — 250N000013 HC RX MED GY IP 250 OP 250 PS 637: Performed by: ORTHOPAEDIC SURGERY

## 2023-12-01 PROCEDURE — 36415 COLL VENOUS BLD VENIPUNCTURE: CPT | Performed by: ORTHOPAEDIC SURGERY

## 2023-12-01 PROCEDURE — 82947 ASSAY GLUCOSE BLOOD QUANT: CPT | Performed by: ORTHOPAEDIC SURGERY

## 2023-12-01 PROCEDURE — 250N000013 HC RX MED GY IP 250 OP 250 PS 637

## 2023-12-01 PROCEDURE — 97116 GAIT TRAINING THERAPY: CPT | Mod: GP

## 2023-12-01 PROCEDURE — 250N000011 HC RX IP 250 OP 636: Mod: JZ | Performed by: ORTHOPAEDIC SURGERY

## 2023-12-01 PROCEDURE — 97161 PT EVAL LOW COMPLEX 20 MIN: CPT | Mod: GP

## 2023-12-01 RX ORDER — HYDROMORPHONE HYDROCHLORIDE 2 MG/1
2 TABLET ORAL EVERY 4 HOURS PRN
Status: DISCONTINUED | OUTPATIENT
Start: 2023-12-01 | End: 2023-12-02

## 2023-12-01 RX ORDER — HYDROXYZINE HYDROCHLORIDE 25 MG/1
50 TABLET, FILM COATED ORAL EVERY 6 HOURS PRN
Status: DISCONTINUED | OUTPATIENT
Start: 2023-12-01 | End: 2023-12-03 | Stop reason: HOSPADM

## 2023-12-01 RX ORDER — OXYCODONE HYDROCHLORIDE 5 MG/1
10 TABLET ORAL
Status: DISCONTINUED | OUTPATIENT
Start: 2023-12-01 | End: 2023-12-01

## 2023-12-01 RX ORDER — OXYCODONE HYDROCHLORIDE 5 MG/1
5 TABLET ORAL
Status: DISCONTINUED | OUTPATIENT
Start: 2023-12-01 | End: 2023-12-01

## 2023-12-01 RX ORDER — CELECOXIB 200 MG/1
200 CAPSULE ORAL DAILY
Qty: 7 CAPSULE | Refills: 0 | Status: SHIPPED | OUTPATIENT
Start: 2023-12-01 | End: 2024-01-30

## 2023-12-01 RX ORDER — HYDROXYZINE HYDROCHLORIDE 25 MG/1
25 TABLET, FILM COATED ORAL EVERY 6 HOURS PRN
Status: DISCONTINUED | OUTPATIENT
Start: 2023-12-01 | End: 2023-12-03 | Stop reason: HOSPADM

## 2023-12-01 RX ORDER — METHOCARBAMOL 500 MG/1
500 TABLET, FILM COATED ORAL 3 TIMES DAILY
Qty: 21 TABLET | Refills: 0 | Status: SHIPPED | OUTPATIENT
Start: 2023-12-01 | End: 2024-01-30

## 2023-12-01 RX ORDER — HYDROMORPHONE HYDROCHLORIDE 2 MG/1
2 TABLET ORAL EVERY 6 HOURS PRN
Qty: 20 TABLET | Refills: 0 | Status: SHIPPED | OUTPATIENT
Start: 2023-12-01 | End: 2023-12-02

## 2023-12-01 RX ORDER — HYDROXYZINE HYDROCHLORIDE 25 MG/1
25 TABLET, FILM COATED ORAL EVERY 6 HOURS PRN
Qty: 25 TABLET | Refills: 0 | Status: SHIPPED | OUTPATIENT
Start: 2023-12-01 | End: 2024-01-30

## 2023-12-01 RX ADMIN — OXYCODONE HYDROCHLORIDE 10 MG: 5 TABLET ORAL at 07:06

## 2023-12-01 RX ADMIN — ACETAMINOPHEN 975 MG: 325 TABLET, FILM COATED ORAL at 07:04

## 2023-12-01 RX ADMIN — ASPIRIN 81 MG: 81 TABLET, COATED ORAL at 20:25

## 2023-12-01 RX ADMIN — CEFAZOLIN SODIUM 2 G: 2 INJECTION, SOLUTION INTRAVENOUS at 08:51

## 2023-12-01 RX ADMIN — HYDROMORPHONE HYDROCHLORIDE 0.4 MG: 0.2 INJECTION, SOLUTION INTRAMUSCULAR; INTRAVENOUS; SUBCUTANEOUS at 08:51

## 2023-12-01 RX ADMIN — ASPIRIN 81 MG: 81 TABLET, COATED ORAL at 08:51

## 2023-12-01 RX ADMIN — KETOROLAC TROMETHAMINE 15 MG: 15 INJECTION, SOLUTION INTRAMUSCULAR; INTRAVENOUS at 08:51

## 2023-12-01 RX ADMIN — DOCUSATE SODIUM 50 MG AND SENNOSIDES 8.6 MG 1 TABLET: 8.6; 5 TABLET, FILM COATED ORAL at 08:51

## 2023-12-01 RX ADMIN — OXYCODONE HYDROCHLORIDE 10 MG: 5 TABLET ORAL at 03:05

## 2023-12-01 RX ADMIN — HYDROMORPHONE HYDROCHLORIDE 0.4 MG: 0.2 INJECTION, SOLUTION INTRAMUSCULAR; INTRAVENOUS; SUBCUTANEOUS at 21:40

## 2023-12-01 RX ADMIN — KETOROLAC TROMETHAMINE 15 MG: 15 INJECTION, SOLUTION INTRAMUSCULAR; INTRAVENOUS at 02:26

## 2023-12-01 RX ADMIN — OXYCODONE HYDROCHLORIDE 10 MG: 5 TABLET ORAL at 18:39

## 2023-12-01 RX ADMIN — POLYETHYLENE GLYCOL 3350 17 G: 17 POWDER, FOR SOLUTION ORAL at 08:51

## 2023-12-01 RX ADMIN — ACETAMINOPHEN 975 MG: 325 TABLET, FILM COATED ORAL at 23:34

## 2023-12-01 RX ADMIN — HYDROMORPHONE HYDROCHLORIDE 0.4 MG: 0.2 INJECTION, SOLUTION INTRAMUSCULAR; INTRAVENOUS; SUBCUTANEOUS at 13:25

## 2023-12-01 RX ADMIN — ACETAMINOPHEN 975 MG: 325 TABLET, FILM COATED ORAL at 14:29

## 2023-12-01 RX ADMIN — DOCUSATE SODIUM 50 MG AND SENNOSIDES 8.6 MG 1 TABLET: 8.6; 5 TABLET, FILM COATED ORAL at 20:25

## 2023-12-01 RX ADMIN — OXYCODONE HYDROCHLORIDE 10 MG: 5 TABLET ORAL at 11:55

## 2023-12-01 RX ADMIN — KETOROLAC TROMETHAMINE 15 MG: 15 INJECTION, SOLUTION INTRAMUSCULAR; INTRAVENOUS at 14:29

## 2023-12-01 RX ADMIN — CEFAZOLIN SODIUM 2 G: 2 INJECTION, SOLUTION INTRAVENOUS at 00:51

## 2023-12-01 RX ADMIN — KETOROLAC TROMETHAMINE 15 MG: 15 INJECTION, SOLUTION INTRAMUSCULAR; INTRAVENOUS at 20:17

## 2023-12-01 RX ADMIN — HYDROXYZINE HYDROCHLORIDE 50 MG: 25 TABLET, FILM COATED ORAL at 16:50

## 2023-12-01 RX ADMIN — HYDROXYZINE HYDROCHLORIDE 50 MG: 25 TABLET, FILM COATED ORAL at 23:34

## 2023-12-01 RX ADMIN — HYDROMORPHONE HYDROCHLORIDE 0.4 MG: 0.2 INJECTION, SOLUTION INTRAMUSCULAR; INTRAVENOUS; SUBCUTANEOUS at 04:18

## 2023-12-01 RX ADMIN — HYDROMORPHONE HYDROCHLORIDE 2 MG: 2 TABLET ORAL at 23:34

## 2023-12-01 RX ADMIN — HYDROMORPHONE HYDROCHLORIDE 0.4 MG: 0.2 INJECTION, SOLUTION INTRAMUSCULAR; INTRAVENOUS; SUBCUTANEOUS at 15:58

## 2023-12-01 ASSESSMENT — ACTIVITIES OF DAILY LIVING (ADL)
ADLS_ACUITY_SCORE: 18
ADLS_ACUITY_SCORE: 21
ADLS_ACUITY_SCORE: 18
ADLS_ACUITY_SCORE: 18

## 2023-12-01 NOTE — PROGRESS NOTES
"   12/01/23 1000   Appointment Info   Signing Clinician's Name / Credentials (PT) Manasa Motley DPT   Rehab Comments (PT) NWB L LE   Living Environment   People in Home parent(s)   Current Living Arrangements house   Home Accessibility no concerns;wheelchair accessible   Transportation Anticipated family or friend will provide   Living Environment Comments Pt will be returning to her [facility] for recovery, no LUH or within the home.   Self-Care   Usual Activity Tolerance good   Current Activity Tolerance fair   Regular Exercise No   Equipment Currently Used at Home none   Fall history within last six months no   Activity/Exercise/Self-Care Comment Pt reports having B crutches and a knee scooter at home for use but usually is IND with all ADL's at baseline   General Information   Onset of Illness/Injury or Date of Surgery 11/30/23   Referring Physician Adalid Torres MD   Patient/Family Therapy Goals Statement (PT) \"To go back home\"   Pertinent History of Current Problem (include personal factors and/or comorbidities that impact the POC) Pt is status post open reduction and internal fixation of left ankle fracture on 11/30/23. No acute events overnight. Afebrile with stable vitals. Her pain was difficult to control in PACU but it improved significantly overnight.   Existing Precautions/Restrictions fall   Weight-Bearing Status - LUE full weight-bearing   Weight-Bearing Status - RUE full weight-bearing   Weight-Bearing Status - LLE nonweight-bearing   Weight-Bearing Status - RLE full weight-bearing   Cognition   Affect/Mental Status (Cognition) WNL   Orientation Status (Cognition) oriented x 4   Follows Commands (Cognition) WNL   Pain Assessment   Patient Currently in Pain Yes, see Vital Sign flowsheet  (Pt reports increased pain in L ankle at the incision and throughout the ankle)   Integumentary/Edema   Integumentary/Edema other (describe)   Integumentary/Edema Comments Pt has increased swelling " throughout L LE below the knee   Posture    Posture Forward head position;Protracted shoulders   Range of Motion (ROM)   Range of Motion ROM deficits secondary to surgical procedure;ROM deficits secondary to pain;ROM deficits secondary to swelling;ROM deficits secondary to weakness   Strength (Manual Muscle Testing)   Strength (Manual Muscle Testing) Able to perform R SLR;Able to perform L SLR;Deficits observed during functional mobility   Bed Mobility   Comment, (Bed Mobility) Supine>sitting EOB completed w/ CGA   Transfers   Comment, (Transfers) Sit>stand completed w/ CGA   Gait/Stairs (Locomotion)   Comment, (Gait/Stairs) Pt able to ambulate w/ B axillary crutches   Balance   Balance other (describe)   Balance Comments Pt demonstrated good sitting balance, required B axillary crutches for static standing and dynamic balance   Sensory Examination   Sensory Perception patient reports no sensory changes   Coordination   Coordination no deficits were identified   Muscle Tone   Muscle Tone no deficits were identified   Clinical Impression   Criteria for Skilled Therapeutic Intervention Yes, treatment indicated   PT Diagnosis (PT) Impaired functional mobility   Influenced by the following impairments Post-surgical pain, impaired activity tolerance, impaired ROM   Functional limitations due to impairments Impaired gait and inability to complee ADL's   Clinical Presentation (PT Evaluation Complexity) stable   Clinical Presentation Rationale Clinical judgment   Clinical Decision Making (Complexity) low complexity   Planned Therapy Interventions (PT) balance training;bed mobility training;gait training;home exercise program;motor coordination training;neuromuscular re-education;patient/family education;postural re-education;ROM (range of motion);stair training;strengthening;transfer training;progressive activity/exercise;risk factor education;home program guidelines   Risk & Benefits of therapy have been explained  evaluation/treatment results reviewed;care plan/treatment goals reviewed;risks/benefits reviewed;current/potential barriers reviewed;participants voiced agreement with care plan;participants included;patient;spouse/significant other   PT Total Evaluation Time   PT Eval, Low Complexity Minutes (64220) 10   Physical Therapy Goals   PT Frequency 2x/day   PT Predicted Duration/Target Date for Goal Attainment 12/08/23   PT Goals Bed Mobility;Transfers;Gait   PT: Bed Mobility Modified independent;Supine to/from sit;Rolling;Bridging   PT: Transfers Modified independent;Sit to/from stand;Bed to/from chair;Assistive device;Within precautions   PT: Gait Independent;Modified independent;Crutches;Within precautions;25 feet   Interventions   Interventions Quick Adds Gait Training;Therapeutic Activity   Therapeutic Activity   Therapeutic Activities: dynamic activities to improve functional performance Minutes (26366) 18   Treatment Detail/Skilled Intervention Evaluation completed and treatment indicated. Pt educated on NWB status. Increased time needed throughout for pain neuroscience education, pt in tears throguhout session. Supine>sitting EOB completed w/ CGA, step by step instruction given for technique on L LE. Sit>stand compelted x 3 reps w/ B axillary crutches, pt cued to use one hand to push up from the bed and B crutches in other hand. Post-ambulation, pt cued to reach back for bed prior to sitting to control descent. Sit>supine completed w/ CGA, cues given for L LE splint managment. Pt educated on dischrge planning, elevation of L LE, and pain managment. Pt left supine in bed with all needs in reach and RN notified.   Gait Training   Gait Training Minutes (48932) 8   Treatment Detail/Skilled Intervention Pt ambulated w/ B axillary crutches for ~25 ft and CGA. Cues given for crutches technique, pacing, and upright posture. Pt also educated on how to properly measure crtutches. Pt attempted to trial knee scooter, unable  to tolerate positioning of knee scooter due to increased pain so this was terminated.   Distance in Feet 25 ft   [place] Level (Gait Training) contact guard   Weight Bearing (Gait Training) nonweight-bearing   Assistive Device (Gait Training) axillary crutches   PT Discharge Planning   PT Plan Check with RN pending pain managment, Trial further ambulation with crutches and knee scooter   PT Discharge Recommendation (DC Rec) home with assist   PT Rationale for DC Rec Pt is moving significantly below baseline, mainly limited due to post-surgical pain at this time. [facility] and reports having a hospital bed at home currently. Anticipate with further pain managment and one additional PT session that pt will be safe to return home with usage of B axillary crutches and knee scooter, and assist of her parents for mobility.   PT Brief overview of current status CGA for all functional mobility   PT Equipment Needed at Discharge crutches, axillary;other (see comments)  (Knee scooter, Pt has crutches and knee scooter at home already)   Total Session Time   Timed Code Treatment Minutes 26   Total Session Time (sum of timed and untimed services) 36                                                                                 [facility]      OUTPATIENT PHYSICAL THERAPY EVALUATION  PLAN OF TREATMENT FOR OUTPATIENT REHABILITATION  (COMPLETE FOR INITIAL CLAIMS ONLY)  Patient's Last Name, First Name, M.I.  YOB: 2000  Aranza Garibay                        Provider's Name  [facility] Medical Record No.  [id number]                               Onset Date:  11/30/23   Start of Care Date:         Type:     _X_PT   ___OT   ___SLP Medical Diagnosis:                           PT Diagnosis:  Impaired functional mobility   Visits from SOC:  1    _________________________________________________________________________________  Plan of Treatment/Functional Goals    Planned Interventions: balance training, bed mobility training, gait training, home exercise program, motor coordination training, neuromuscular re-education, patient/family education, postural re-education, ROM (range of motion), stair training, strengthening, transfer training, progressive activity/exercise, risk factor education, home program guidelines     Goals: See Physical Therapy Goals on Care Plan in [facility] electronic health record.    Therapy Frequency: 2x/day  Predicted Duration of Therapy Intervention: 12/08/23  _________________________________________________________________________________    I CERTIFY THE NEED FOR THESE SERVICES FURNISHED UNDER        THIS PLAN OF TREATMENT AND WHILE UNDER MY CARE     (Physician attestation of this document indicates review and certification of the therapy plan).               ,      Referring Physician: Adalid Torres MD            Initial Assessment        See Physical Therapy evaluation dated   in Epic electronic health record.

## 2023-12-01 NOTE — ANESTHESIA POSTPROCEDURE EVALUATION
Patient: Aranza Garibay    Procedure: Procedure(s):  Left ankle Open reduction internal fixation       Anesthesia Type:  General    Note:  Disposition: Admission   Postop Pain Control: Uneventful            Sign Out: Well controlled pain   PONV: No   Neuro/Psych: Uneventful            Sign Out: Acceptable/Baseline neuro status   Airway/Respiratory: Uneventful            Sign Out: Acceptable/Baseline resp. status   CV/Hemodynamics: Uneventful            Sign Out: Acceptable CV status; No obvious hypovolemia; No obvious fluid overload   Other NRE: NONE   DID A NON-ROUTINE EVENT OCCUR? No           Last vitals:  Vitals Value Taken Time   /67 11/30/23 2130   Temp 36.7  C (98  F) 11/30/23 2120   Pulse 94 11/30/23 2137   Resp 11 11/30/23 2137   SpO2 96 % 11/30/23 2141   Vitals shown include unfiled device data.    Electronically Signed By: Flavia Marlow MD  November 30, 2023  9:45 PM

## 2023-12-01 NOTE — PROGRESS NOTES
PROGRESS NOTE    SUBJECTIVE  Aranza Garibay is status post open reduction and internal fixation of left ankle fracture on 11/30/23. No acute events overnight. Afebrile with stable vitals. Her pain was difficult to control in PACU but it improved significantly overnight.     PHYSICAL EXAM  General: No acute distress. Alert and oriented to person, time and place.  Pulmonary: Breathing comfortably on room air.   Cardiac: Bilateral PT pulses are equal and symmetric. Regular rate and rhythm.   Musculoskeletal: Left lower extremity splint in place. Left leg appropriately elevated.   Neurological: Patient is able to flex and extend exposed toes. SILT in the exposed dorsal and plantar foot.     LABS  Pending      ASSESSMENT/PLAN  #1 Status post open reduction and internal fixation of left ankle fracture on 11/30/23    Aranza Garibay is status post the above procedure. She is doing well this morning. Pain is under much better control this morning. She will work with PT today and as long as PT goes well and her pain remains well controlled, she can discharge home today.     Activity: Nonweightbearing left lower extremity. Activity as tolerated. No precautions. Continue to work with PT while in hospital. Elevate LLE when in bed.   Antibiotics: Weight-based Ancef x2 doses postoperatively or until discharge.  Cultures: None  Diet: Advance as tolerated. Goal diet: General.   Dressing: Keep left lower extremity splint clean and dry.   DVT Prophylaxis: Aspirin 81mg BID for 6 weeks  Pain medication: Multimodal pain regimen with ice, elevation, Tylenol 1000mg Q6H scheduled, Celebrex 200mg BID, Oxycodone 5-10mg Q4 PRN    Disposition: Stable and improving. Discharge home when patient has cleared PT

## 2023-12-01 NOTE — PLAN OF CARE
Trauma/Ortho/Medical (Choose one) Trauma    Diagnosis Left Ankle ORIF  POD#: 1  Mental Status:A&O x4  Activity/dangle Assist of 1 with crutches.  Diet: Reg  Pain: Oxycodone, IV dilaudid, Toradol, and tylenol  Eduardo/Voiding: voiding via external catheter  Tele/Restraints/Iso:N/A  02/LDA: RA/ IV-SL  D/C Date:TBD  Other Info: NWB-LLE. Dressing CDI. Left ankle splinted and elevated on wedge pillow. Patient did not tolerate PT due pain, PT will work with patient again in the morning.

## 2023-12-01 NOTE — ANESTHESIA CARE TRANSFER NOTE
Patient: Aranza Garibay    Procedure: Procedure(s):  Left ankle Open reduction internal fixation       Diagnosis: Ankle fracture [S82.899A]  Diagnosis Additional Information: No value filed.    Anesthesia Type:   General     Note:    Oropharynx: oropharynx clear of all foreign objects  Level of Consciousness: awake  Oxygen Supplementation: face mask  Level of Supplemental Oxygen (L/min / FiO2): 8  Independent Airway: airway patency satisfactory and stable  Dentition: dentition unchanged  Vital Signs Stable: post-procedure vital signs reviewed and stable  Report to RN Given: handoff report given  Patient transferred to: PACU    Handoff Report: Identifed the Patient, Identified the Reponsible Provider, Reviewed the pertinent medical history, Discussed the surgical course, Reviewed Intra-OP anesthesia mangement and issues during anesthesia, Set expectations for post-procedure period and Allowed opportunity for questions and acknowledgement of understanding  Vitals:  Vitals Value Taken Time   BP     Temp     Pulse     Resp     SpO2         Electronically Signed By: FRANCISCO Weiner CRNA  November 30, 2023  8:02 PM

## 2023-12-01 NOTE — OP NOTE
OPERATIVE NOTE    Name: Aranza Garibay  MRN: [id number]  Age: 23 year old    YOB: 2000    Date of Procedure: 12/1/2023      Pre-operative Diagnosis:   Left bimalleolar ankle fracture dislocation     Post-operative Diagnosis:   Left bimalleolar ankle fracture dislocation     Procedure:   Open reduction and internal fixation left posterior malleolus  Open reduction and internal fixation of left medial malleolus  Syndesmotic fixation     22 Modifier: Application of a 22 modifier is appropriate in this case as the case required approximately 30% additional time (1 hr) and effort due to the  patient's habitus and the technical complexity of the fracture.      Second surgeon:  Dr. Tj Blevins    A skilled first assistant was necessary for this procedure for assistance with patient positioning, prepping, draping, surgical visualization, wound closure, and application of the dressing.     Anesthesia:  1. General    Complications:  None    Estimated blood loss:   150cc     Transfusions:  None    Fluids:  Per anesthesia    Specimens:  None    Drain:  None      Indications:   The patient is a very pleasant 23 year old female who unfortunately sustained a left ankle fracture dislocation while rollerblading.  This was subsequently closed reduced in the emergency department she was placed into a splint.  I saw her in clinic and discussed the risk and benefits of both operative and nonoperative management.  Since this is an unstable ankle fracture, I recommended proceeding with surgery and the patient agreed.      Informed Consent:  Preoperatively, the risks, benefits, and possible complications of the procedure were discussed. The risks discussed included but were not limited to wear, loosening, infection, neurovascular damage, thromboembolic disease, foot drop, limb length inequality, persistent pain, lateral thigh numbness, wound healing complications, bleeding, transfusion, stiffness and dislocation. All of the  questions were satisfactorily answered and the patient wished to proceed with joint replacement surgery to improve their lifestyle and reduce their pain.       Components:  Synthes 4 hole 2.7mm plate   Arthrex tight rope  4.0mm partially threaded cancellous screws     Procedural Pause:   The patient's correct identity, side, site, and procedure to be performed was verified.  Intravenous antibiotics were administered prior to skin incision.    Description of Procedure:   Aranza Garibay was brought to the operating room.  Anesthesia was induced.  She was then placed prone on the operating room table.  All bony prominences were well-padded.  A tourniquet was applied.  The left lower extremity was prepped and draped in routine sterile fashion.  A procedural pause was performed as described above.  We began with a posterior lateral approach to the ankle.  A skin incision was made in between the posterior border of the fibula and the Achilles tendon.  Dissection was carried down to the peroneal fascia which was split.  The peroneal tendons were then retracted laterally and the flexor hallucis longus fascia was observed and split in line with the skin incision.  The FHL was then retracted medially and the posterior aspect of the tibia was exposed.  Exposed the fracture site being careful to preserve the posterior inferior tibiofibular ligament.  The fracture was then reduced and held in place with K wires.  The reduction was checked on biplanar fluoroscopy.  A 2.7mm plate was then sized.  A screw was placed at the apex of the fracture in anti-glade fashion. We then placed two screws distally and lagged by technique.  The reduction was confirmed on biplanar fluoroscopy.  We then obtained mortise view and found the syndesmosis to not be adequately reduced and decided to place a tightrope device. The posterior aspect of the fibula was exposed through the same incision. Biplanar fluoroscopy was utilized to confirmed adequate  position and placement of the tightrope device.  We then exposed the medial malleolus.  The medial malleolus was then reduced.  2 K wires were placed to hold the reduction.  We then placed 2 4.0 partially threaded cancellous screws.  Satisfied with the reduction, we obtained final AP, mortise, and lateral views of the ankle.  Satisfied with the reduction and position of the hardware, we proceeded to closure.  The wounds were irrigated with normal saline.  The skin was closed with 3-0 nylon in vertical mattress fashion.  The patient was then placed into a well-padded splint.  The patient will remain in the splint for 2 weeks.  All counts were correct.  The patient woke up from general anesthesia without issue.  She was then transferred to the recovery room and will then go to the general orthopedic floor.  She will stay overnight.      Post-operative Plan:  Activity: Weightbearing as tolerated  Antibiotics: Weight-based Ancef x2 doses   DVT: Aspirin 81mg BID  Wound: Left lower extremity splint  Disposition: Outpatient overnight      Adalid Torres MD  [facility]  Phone: [phone]  Fax: [phone]

## 2023-12-01 NOTE — PROGRESS NOTES
Patient vital signs are at baseline: Yes  Patient able to ambulate as they were prior to admission or with assist devices provided by therapies during their stay:  No,  Reason:  Not OOB yet  Patient MUST void prior to discharge:  No,  Reason:  DTV  Patient able to tolerate oral intake:  Yes  Pain has adequate pain control using Oral analgesics:  No,  Reason:  Taking IV dilaudid and Oxycodone  Does patient have an identified :  Yes  Has goal D/C date and time been discussed with patient:  Yes    Pt A&Ox4, Anxious, tearful, S/o at bedside. VSS on RA. Regular diet. L ankle dressing CDI, CMS intact, elevated on wedge w/ good cap refill. C/o increased pain, PRN Oxy and Tylenol given x1. C/o of nausea but denied intervention. DTV, Bladder scan @ 2140 is 224cc. Not OOB yet. Possible discharge tomorrow. Continue plan of care.

## 2023-12-01 NOTE — DISCHARGE SUMMARY
Physician Discharge Summary     Patient ID:  Aranza Garibay  [id number]  23 year old  2000    Admit date: 11/30/2023    Discharge date and time: 12/3/23     Admitting Physician: Adalid Torres MD     Discharge Physician: Adalid Torres MD    Admission Diagnoses: Ankle fracture [S82.899A]  Status post open reduction with internal fixation (ORIF) of fracture of ankle [Z98.890, Z87.81]    Discharge Diagnoses: Status post open reduction and internal fixation of left ankle fracture    Admission Condition: good    Discharged Condition: stable    Indication for Admission: Open reduction and internal fixation of left ankle fracture    Hospital Course:   The patient was admitted for the above procedure on the above date. The patient tolerated the procedure well. The patient's hospital course was unremarkable and the patient progressed as expected. Pain was adequately controlled on oral pain medication. The patient was tolerating a general diet. The patient passed physical therapy without issue. The patient remained hemodynamically stable throughout the admission. Please refer to the final progress note for discharge physical exam. The patient met all discharge criteria and discharged in a stable condition. The patient has our contact information and understands reasons to contact our team or return to the Emergency Department.     Consults: None    Significant Diagnostic Studies: None    Treatments: Open reduction and internal fixation of left ankle fracture    Discharge Exam:  Please refer to physical exam from 12/1/23 progress note    Disposition: home    Patient Instructions:   Current Discharge Medication List        START taking these medications    Details   !! acetaminophen (TYLENOL) 325 MG tablet Take 2 tablets (650 mg) by mouth every 4 hours as needed for other (mild pain)  Qty: 100 tablet, Refills: 0    Associated Diagnoses: Ankle fracture, bimalleolar, closed, left, initial encounter      aspirin 81 MG EC  tablet Take 1 tablet (81 mg) by mouth 2 times daily  Qty: 60 tablet, Refills: 0    Associated Diagnoses: Ankle fracture, bimalleolar, closed, left, initial encounter      celecoxib (CELEBREX) 200 MG capsule Take 1 capsule (200 mg) by mouth daily for 7 days  Qty: 7 capsule, Refills: 0    Associated Diagnoses: Ankle fracture, bimalleolar, closed, left, initial encounter      oxyCODONE (ROXICODONE) 5 MG tablet Take 1-2 tablets (5-10 mg) by mouth every 4 hours as needed for moderate to severe pain  Qty: 20 tablet, Refills: 0    Associated Diagnoses: Ankle fracture, bimalleolar, closed, left, initial encounter      polyethylene glycol (MIRALAX) 17 g packet Take 17 g by mouth daily    Associated Diagnoses: Ankle fracture, bimalleolar, closed, left, initial encounter      senna-docusate (SENOKOT-S/PERICOLACE) 8.6-50 MG tablet Take 1-2 tablets by mouth 2 times daily Take while on oral narcotics to prevent or treat constipation.    Comments: While taking narcotics  Associated Diagnoses: Ankle fracture, bimalleolar, closed, left, initial encounter       !! - Potential duplicate medications found. Please discuss with provider.        CONTINUE these medications which have CHANGED    Details   methocarbamol (ROBAXIN) 500 MG tablet Take 1 tablet (500 mg) by mouth 3 times daily  Qty: 21 tablet, Refills: 0    Associated Diagnoses: Ankle fracture, bimalleolar, closed, left, initial encounter           CONTINUE these medications which have NOT CHANGED    Details   !! acetaminophen (TYLENOL) 500 MG tablet Take 500-1,000 mg by mouth every 6 hours as needed for mild pain      buPROPion (WELLBUTRIN XL) 300 MG 24 hr tablet Take 1 tablet by mouth daily at 2 pm      HYDROcodone-acetaminophen (NORCO) 5-325 MG tablet Take 1 tablet by mouth every 6 hours as needed for severe pain       !! - Potential duplicate medications found. Please discuss with provider.        STOP taking these medications       ibuprofen (ADVIL/MOTRIN) 800 MG tablet  Comments:   Reason for Stopping:             Activity: activity as tolerated  Diet: regular diet  Wound Care: Keep splint clean and dry    Follow-up with Dr. Torres in 2 weeks.    Signed:  Adalid Torres MD  12/1/2023  7:07 AM

## 2023-12-01 NOTE — PLAN OF CARE
Goal Outcome Evaluation:    Patient vital signs are at baseline: Yes  Patient able to ambulate as they were prior to admission or with assist devices provided by therapies during their stay:  No,  Reason:  Not OOB d/t pain  Patient MUST void prior to discharge:  Yes  Patient able to tolerate oral intake:  Yes  Pain has adequate pain control using Oral analgesics:  No,  Reason:  IV Dilaudid given x1. On top of oral Oxycodone and Tylenol.  Does patient have an identified :  Yes  Has goal D/C date and time been discussed with patient:  Yes     A/O x4. CMS intact, able to wiggle toes. Dressing CDI. LLE elevated on wedge.   stayed in room overnight.

## 2023-12-02 ENCOUNTER — APPOINTMENT (OUTPATIENT)
Dept: PHYSICAL THERAPY | Facility: CLINIC | Age: 23
End: 2023-12-02
Attending: ORTHOPAEDIC SURGERY
Payer: COMMERCIAL

## 2023-12-02 LAB — HGB BLD-MCNC: 10.7 G/DL (ref 11.7–15.7)

## 2023-12-02 PROCEDURE — 250N000011 HC RX IP 250 OP 636: Mod: JZ | Performed by: ORTHOPAEDIC SURGERY

## 2023-12-02 PROCEDURE — 97530 THERAPEUTIC ACTIVITIES: CPT | Mod: GP

## 2023-12-02 PROCEDURE — 250N000013 HC RX MED GY IP 250 OP 250 PS 637

## 2023-12-02 PROCEDURE — 36415 COLL VENOUS BLD VENIPUNCTURE: CPT | Performed by: ORTHOPAEDIC SURGERY

## 2023-12-02 PROCEDURE — 250N000013 HC RX MED GY IP 250 OP 250 PS 637: Performed by: PHYSICIAN ASSISTANT

## 2023-12-02 PROCEDURE — 85018 HEMOGLOBIN: CPT | Performed by: ORTHOPAEDIC SURGERY

## 2023-12-02 PROCEDURE — 250N000013 HC RX MED GY IP 250 OP 250 PS 637: Performed by: ORTHOPAEDIC SURGERY

## 2023-12-02 RX ORDER — KETOROLAC TROMETHAMINE 15 MG/ML
15 INJECTION, SOLUTION INTRAMUSCULAR; INTRAVENOUS EVERY 6 HOURS PRN
Status: COMPLETED | OUTPATIENT
Start: 2023-12-02 | End: 2023-12-03

## 2023-12-02 RX ORDER — HYDROMORPHONE HYDROCHLORIDE 2 MG/1
2 TABLET ORAL EVERY 4 HOURS PRN
Qty: 30 TABLET | Refills: 0 | Status: SHIPPED | OUTPATIENT
Start: 2023-12-02 | End: 2024-01-30

## 2023-12-02 RX ORDER — HYDROMORPHONE HYDROCHLORIDE 2 MG/1
4 TABLET ORAL
Status: DISCONTINUED | OUTPATIENT
Start: 2023-12-02 | End: 2023-12-03 | Stop reason: HOSPADM

## 2023-12-02 RX ADMIN — HYDROXYZINE HYDROCHLORIDE 50 MG: 25 TABLET, FILM COATED ORAL at 20:47

## 2023-12-02 RX ADMIN — HYDROMORPHONE HYDROCHLORIDE 0.4 MG: 0.2 INJECTION, SOLUTION INTRAMUSCULAR; INTRAVENOUS; SUBCUTANEOUS at 05:46

## 2023-12-02 RX ADMIN — HYDROXYZINE HYDROCHLORIDE 50 MG: 25 TABLET, FILM COATED ORAL at 05:49

## 2023-12-02 RX ADMIN — HYDROXYZINE HYDROCHLORIDE 50 MG: 25 TABLET, FILM COATED ORAL at 11:38

## 2023-12-02 RX ADMIN — HYDROMORPHONE HYDROCHLORIDE 2 MG: 2 TABLET ORAL at 07:20

## 2023-12-02 RX ADMIN — ASPIRIN 81 MG: 81 TABLET, COATED ORAL at 20:47

## 2023-12-02 RX ADMIN — KETOROLAC TROMETHAMINE 15 MG: 15 INJECTION, SOLUTION INTRAMUSCULAR; INTRAVENOUS at 15:49

## 2023-12-02 RX ADMIN — ACETAMINOPHEN 975 MG: 325 TABLET, FILM COATED ORAL at 22:26

## 2023-12-02 RX ADMIN — HYDROMORPHONE HYDROCHLORIDE 0.4 MG: 0.2 INJECTION, SOLUTION INTRAMUSCULAR; INTRAVENOUS; SUBCUTANEOUS at 13:37

## 2023-12-02 RX ADMIN — KETOROLAC TROMETHAMINE 15 MG: 15 INJECTION, SOLUTION INTRAMUSCULAR; INTRAVENOUS at 22:27

## 2023-12-02 RX ADMIN — ACETAMINOPHEN 975 MG: 325 TABLET, FILM COATED ORAL at 15:44

## 2023-12-02 RX ADMIN — DOCUSATE SODIUM 50 MG AND SENNOSIDES 8.6 MG 1 TABLET: 8.6; 5 TABLET, FILM COATED ORAL at 20:47

## 2023-12-02 RX ADMIN — HYDROMORPHONE HYDROCHLORIDE 2 MG: 2 TABLET ORAL at 11:38

## 2023-12-02 RX ADMIN — POLYETHYLENE GLYCOL 3350 17 G: 17 POWDER, FOR SOLUTION ORAL at 09:09

## 2023-12-02 RX ADMIN — DOCUSATE SODIUM 50 MG AND SENNOSIDES 8.6 MG 1 TABLET: 8.6; 5 TABLET, FILM COATED ORAL at 09:09

## 2023-12-02 RX ADMIN — ASPIRIN 81 MG: 81 TABLET, COATED ORAL at 09:09

## 2023-12-02 RX ADMIN — ACETAMINOPHEN 975 MG: 325 TABLET, FILM COATED ORAL at 06:39

## 2023-12-02 RX ADMIN — HYDROMORPHONE HYDROCHLORIDE 0.4 MG: 0.2 INJECTION, SOLUTION INTRAMUSCULAR; INTRAVENOUS; SUBCUTANEOUS at 09:09

## 2023-12-02 RX ADMIN — HYDROMORPHONE HYDROCHLORIDE 0.4 MG: 0.2 INJECTION, SOLUTION INTRAMUSCULAR; INTRAVENOUS; SUBCUTANEOUS at 17:36

## 2023-12-02 ASSESSMENT — ACTIVITIES OF DAILY LIVING (ADL)
ADLS_ACUITY_SCORE: 22

## 2023-12-02 NOTE — PROGRESS NOTES
"MD Notification    Notified Person:     Notified Person Name: Heath (Answering service for [facility])     Notification Date/Time:12/1/23 8:45pm    Notification Interaction: Call    Purpose of Notification: Pt is not tolerating Oxycodone; stated \"It never works\" rated pain 10/10 1.5hr after its administration. Requesting changing PO Oxycodone to PO dilaudid as Pt stated she was able to get relief from IV dilaudid given at 4pm today.    Orders Received: Will put orders in for PO dilaudid     Comments: Will call back w/ Dr on call.  "

## 2023-12-02 NOTE — PLAN OF CARE
Patient vital signs are at baseline: Yes  Patient able to ambulate as they were prior to admission or with assist devices provided by therapies during their stay:  Yes  Patient MUST void prior to discharge:  Yes  Patient able to tolerate oral intake:  Yes  Pain has adequate pain control using Oral analgesics:  Yes  Does patient have an identified :  Yes  Has goal D/C date and time been discussed with patient:  Yes   Pt A&OX4, VSS, dressing clean dry and intact CMS intact, pt had pain 9/10 scale and was managed with dilaudid, atrax, and Oxycodone.                          Echo with mod to severe MR, mild to mod pulm HTN based on last 2 echo  Acute on chronic hypercapnic respiratory failure; now improved  Decreased mental status may be due hypercarbia as she improved on AVAPS  Likely KYREE/OSH  CAD s/p CABG and PCI  PAD s/p baloon angioplasty  H/o cardiac arrest  CHFpEF  Diastolic dysfunction  Prior pleural effusion s/p drainage  Legally blind    Plan:    Continue AVAPS nocturnal and prn; now doing well of of NIV  Would recommend chronic NIV for chronic hypercarbic respiratory failure to improve symptoms and to decrease hospitalizations; BiPAP was considered and tried without resolution of hypercarbia  Diurese as tolerates  Optimize cardiac function  Follow ABG as needed though last ABG was improved, compensated hypercarbia  On heparin for DVT prophylaxis  On PPI for GI prophylaxis  Albuterol as needed  Mobilize as able  Likely eventual SINGH with NIV  Follow CXR for improvement of CHF; clinically has improved  Pulm f/u with PFTs as outpt  Little else to add - Recall prn

## 2023-12-02 NOTE — PLAN OF CARE
Patient vital signs are at baseline: Yes, on RA.  Patient able to ambulate as they were prior to admission or with assist devices provided by therapies during their stay:  Yes, Up w/ 1 assit crutch  Patient MUST void prior to discharge:  Yes, pure wick  Patient able to tolerate oral intake:  Yes, tolerating reg diet  Pain has adequate pain control using Oral analgesics:  Yes, tylenol, IV & PO Dilaudid, toradol, atarax, oxycodone (Pt requested OXY to be stopped)  Does patient have an identified :  Yes  Has goal D/C date and time been discussed with patient:  No,  Reason:  TBD     A&Ox4. CMS Intact.dressing CDI.

## 2023-12-02 NOTE — PLAN OF CARE
Trauma/Ortho/Medical (Choose one) Trauma     Diagnosis Left Ankle ORIF  POD#: 2  Mental Status:A&O x4  Activity/dangle Assist of 1 with crutches.  Diet: Reg  Pain: IV dilaudid, PO dilaudid, and Atarax  Eduardo/Voiding: voiding via external catheter/ bathroom.  Tele/Restraints/Iso:N/A  02/LDA: RA/ IV-SL  D/C Date:TBD  Other Info: NWB-LLE. Dressing CDI. Left ankle splinted and elevated on wedge pillow. Not discharging today d/t pain. Numbness present on LLE.

## 2023-12-02 NOTE — PROGRESS NOTES
PROGRESS NOTE    SUBJECTIVE  Aranza Garibay is status post open reduction and internal fixation of left ankle fracture on 11/30/23. She had difficulty with pain control yesterday. We switched her oral medication from oxycodone to dilaudid and she seems to respond much better to dilaudid than oxycodone. Pain is under better control this morning. We will see how well her pain is controlled this morning and afternoon and if it is under reasonable control with oral medication then she can discharge today. I also recommend she work with PT and nursing to spend some time out of bed prior to discharge.     PHYSICAL EXAM  General: No acute distress. Alert and oriented to person, time and place.  Pulmonary: Breathing comfortably on room air.   Cardiac: Bilateral PT pulses are equal and symmetric. Regular rate and rhythm.   Musculoskeletal: Left lower extremity splint in place. Left leg appropriately elevated.   Neurological: Patient is able to flex and extend exposed toes. SILT in the exposed dorsal and plantar foot.     LABS  Hb: 12.2 (12/1/23)      ASSESSMENT/PLAN  #1 Status post open reduction and internal fixation of left ankle fracture on 11/30/23    Aranza Garibay is status post the above procedure. She is doing better this morning. Pain is under better control this morning than when I saw her yesterday evening. If pain remains under good control she can discharge home today.     Activity: Nonweightbearing left lower extremity. Activity as tolerated. No precautions. Continue to work with PT while in hospital. Elevate LLE when in bed.   Antibiotics: Weight-based Ancef x2 doses postoperatively or until discharge.  Cultures: None  Diet: Advance as tolerated. Goal diet: General.   Dressing: Keep left lower extremity splint clean and dry.   DVT Prophylaxis: Aspirin 81mg BID for 6 weeks  Pain medication: Multimodal pain regimen with ice, elevation, Tylenol 1000mg Q6H scheduled, Toradol, IV dilaudid, PO Dilaudid Q4 PRN,  Atarax    Disposition: Stable and improving. Discharge home when patient has cleared PT

## 2023-12-02 NOTE — PLAN OF CARE
Goal Outcome Evaluation:       Patient vital signs are at baseline: Yes  Patient able to ambulate as they were prior to admission or with assist devices provided by therapies during their stay: Yes, Up w/ 1 assit crutch  Patient MUST void prior to discharge:   pure wick  Patient able to tolerate oral intake:  Yes  Pain has adequate pain control using Oral analgesics:  Iv Dilaudid x1, oral 1x, atarx, tylenol   Does patient have an identified :  Yes  Has goal D/C date and time been discussed with patient:  TBD     A&Ox  Vital:,VSS on RA,  Diet: Regular diet.  L ankle dressing CDI, CMS intact, elevated on wedge w/ good cap refill. C/o increased pain,

## 2023-12-03 VITALS
TEMPERATURE: 98.2 F | RESPIRATION RATE: 16 BRPM | SYSTOLIC BLOOD PRESSURE: 114 MMHG | OXYGEN SATURATION: 99 % | DIASTOLIC BLOOD PRESSURE: 68 MMHG | WEIGHT: 293 LBS | HEIGHT: 63 IN | BODY MASS INDEX: 51.91 KG/M2 | HEART RATE: 94 BPM

## 2023-12-03 LAB
CREAT SERPL-MCNC: 0.69 MG/DL (ref 0.51–0.95)
EGFRCR SERPLBLD CKD-EPI 2021: >90 ML/MIN/1.73M2

## 2023-12-03 PROCEDURE — 250N000013 HC RX MED GY IP 250 OP 250 PS 637: Performed by: ORTHOPAEDIC SURGERY

## 2023-12-03 PROCEDURE — 250N000011 HC RX IP 250 OP 636: Mod: JZ | Performed by: ORTHOPAEDIC SURGERY

## 2023-12-03 PROCEDURE — 250N000013 HC RX MED GY IP 250 OP 250 PS 637: Performed by: PHYSICIAN ASSISTANT

## 2023-12-03 PROCEDURE — 36415 COLL VENOUS BLD VENIPUNCTURE: CPT | Performed by: ORTHOPAEDIC SURGERY

## 2023-12-03 PROCEDURE — 82565 ASSAY OF CREATININE: CPT | Performed by: ORTHOPAEDIC SURGERY

## 2023-12-03 RX ADMIN — HYDROMORPHONE HYDROCHLORIDE 4 MG: 2 TABLET ORAL at 13:05

## 2023-12-03 RX ADMIN — HYDROXYZINE HYDROCHLORIDE 50 MG: 25 TABLET, FILM COATED ORAL at 09:57

## 2023-12-03 RX ADMIN — DOCUSATE SODIUM 50 MG AND SENNOSIDES 8.6 MG 1 TABLET: 8.6; 5 TABLET, FILM COATED ORAL at 09:09

## 2023-12-03 RX ADMIN — HYDROMORPHONE HYDROCHLORIDE 4 MG: 2 TABLET ORAL at 06:37

## 2023-12-03 RX ADMIN — HYDROMORPHONE HYDROCHLORIDE 4 MG: 2 TABLET ORAL at 09:57

## 2023-12-03 RX ADMIN — ACETAMINOPHEN 975 MG: 325 TABLET, FILM COATED ORAL at 06:37

## 2023-12-03 RX ADMIN — POLYETHYLENE GLYCOL 3350 17 G: 17 POWDER, FOR SOLUTION ORAL at 09:09

## 2023-12-03 RX ADMIN — KETOROLAC TROMETHAMINE 15 MG: 15 INJECTION, SOLUTION INTRAMUSCULAR; INTRAVENOUS at 13:05

## 2023-12-03 RX ADMIN — HYDROMORPHONE HYDROCHLORIDE 4 MG: 2 TABLET ORAL at 00:08

## 2023-12-03 RX ADMIN — ASPIRIN 81 MG: 81 TABLET, COATED ORAL at 09:09

## 2023-12-03 ASSESSMENT — ACTIVITIES OF DAILY LIVING (ADL)
ADLS_ACUITY_SCORE: 23
ADLS_ACUITY_SCORE: 22
ADLS_ACUITY_SCORE: 23
ADLS_ACUITY_SCORE: 22
ADLS_ACUITY_SCORE: 23

## 2023-12-03 NOTE — PROGRESS NOTES
PROGRESS NOTE    SUBJECTIVE  Aranza Garibay is status post open reduction and internal fixation of left ankle fracture on 11/30/23. Doing better this morning. Dilaudid PO is working better for her than Oxycodone PO.  She hopes to discharge home today. She was able to sleep better last night and thinks the pain is under better control.     PHYSICAL EXAM  General: No acute distress. Alert and oriented to person, time and place.  Pulmonary: Breathing comfortably on room air.   Cardiac: Bilateral PT pulses are equal and symmetric. Regular rate and rhythm.   Musculoskeletal: Left lower extremity splint in place. Left leg appropriately elevated.   Neurological: Patient is able to flex and extend exposed toes. SILT in the exposed dorsal and plantar foot.     LABS  Hb: 12.2 (12/1/23)      ASSESSMENT/PLAN  #1 Status post open reduction and internal fixation of left ankle fracture on 11/30/23    Aranza Garibay is status post the above procedure. She is doing better today. She hopes to discharge today. They are travelling to [place]. I recommend timing her discharge for just after she receives her pain medication. She has my contact information and can reach me any time with any questions or concerns.     Activity: Nonweightbearing left lower extremity. Activity as tolerated. No precautions. Continue to work with PT while in hospital. Elevate LLE when in bed.   Antibiotics: Weight-based Ancef x2 doses postoperatively or until discharge.  Cultures: None  Diet: Advance as tolerated. Goal diet: General.   Dressing: Keep left lower extremity splint clean and dry.   DVT Prophylaxis: Aspirin 81mg BID for 6 weeks  Pain medication: Multimodal pain regimen with ice, elevation, Tylenol 1000mg Q6H scheduled, Toradol, IV dilaudid, PO Dilaudid Q4 PRN, Atarax    Disposition: Stable and improving. Discharge home when patient has cleared PT

## 2023-12-03 NOTE — PLAN OF CARE
Physical Therapy Discharge Summary    Reason for therapy discharge:    Discharged to home.    Progress towards therapy goal(s). See goals on Care Plan in [facility] electronic health record for goal details.  Goals not met.  Barriers to achieving goals:   Seen for eval only. .    Therapy recommendation(s):    No further therapy is recommended.Patient has been walking for 2 weeks with crutches after injury and before surgery.     Goal Outcome Evaluation:

## 2023-12-03 NOTE — PLAN OF CARE
Patient is A&O x4. Vss on RA. Assist of 1 with crutches. Dressing CDI. Pain managed with dilaudid, Atarax, and Toradol. IV access removed. Discharge education completed with patient and parent, all questions answered. Personal belongs returned.

## 2023-12-03 NOTE — PLAN OF CARE
Patient vital signs are at baseline: Yes  Patient able to ambulate as they were prior to admission or with assist devices provided by therapies during their stay:  Yes to the bathroom.  Patient MUST void prior to discharge:  Yes  Patient able to tolerate oral intake:  Yes  Pain has adequate pain control using Oral analgesics:  Yes  Does patient have an identified :  Yes  Has goal D/C date and time been discussed with patient:  Yes  Pt A&OX4, CMS intact dressing clean dry and intact. Left ankle splinted and elevated with wedge billwo, pain was managed with IV dilaudid, Atarax, Toradol, and Tylenol.

## 2023-12-31 ENCOUNTER — HEALTH MAINTENANCE LETTER (OUTPATIENT)
Age: 23
End: 2023-12-31

## 2024-01-22 ENCOUNTER — TELEPHONE (OUTPATIENT)
Dept: FAMILY MEDICINE | Facility: CLINIC | Age: 24
End: 2024-01-22
Payer: COMMERCIAL

## 2024-01-22 NOTE — TELEPHONE ENCOUNTER
Reason for Call:  Appointment Request    Patient requesting this type of appt:  office visit, refill medication    Requested provider: Jose M Mari    Reason patient unable to be scheduled: Not within requested timeframe    When does patient want to be seen/preferred time:  ASAP    Comments: Patient states she has an appt with Dr. Mari, but not until to March to establish care, however, before moving here, she's been taking some medications that she will need a refill on.     Wellbutrin 450 Mg  Concerta   72 total   Ritalin      10 MG    Because she has not seen Dr. Mari yet, is she able to do a virtual visit first for the refills? Please call patient back or reach out to her via [facility] to let her know what would be the best appropriate course of action.     Could we send this information to you in [place] or would you prefer to receive a phone call?:   Patient would prefer a phone call   Okay to leave a detailed message?: Yes at Cell number on file:    Telephone Information:   Mobile [phone]     Or can be reahed via [facility]     Call taken on 1/22/2024 at 8:44 AM by ABRAHAM PEOPLES

## 2024-01-24 NOTE — TELEPHONE ENCOUNTER
Patient Contact    Attempt # 1    Was call answered? No.    Left message for patient to call triage back.    Zuri Muller RN

## 2024-01-25 NOTE — TELEPHONE ENCOUNTER
Spoke with patient, she was schedule appointment next week with Jacey GANNON for medication review.       Brittney Uribe RN  [facility]

## 2024-01-29 ASSESSMENT — PATIENT HEALTH QUESTIONNAIRE - PHQ9
10. IF YOU CHECKED OFF ANY PROBLEMS, HOW DIFFICULT HAVE THESE PROBLEMS MADE IT FOR YOU TO DO YOUR WORK, TAKE CARE OF THINGS AT HOME, OR GET ALONG WITH OTHER PEOPLE: SOMEWHAT DIFFICULT
SUM OF ALL RESPONSES TO PHQ QUESTIONS 1-9: 15
SUM OF ALL RESPONSES TO PHQ QUESTIONS 1-9: 15

## 2024-01-29 ASSESSMENT — ANXIETY QUESTIONNAIRES
7. FEELING AFRAID AS IF SOMETHING AWFUL MIGHT HAPPEN: SEVERAL DAYS
5. BEING SO RESTLESS THAT IT IS HARD TO SIT STILL: MORE THAN HALF THE DAYS
4. TROUBLE RELAXING: MORE THAN HALF THE DAYS
8. IF YOU CHECKED OFF ANY PROBLEMS, HOW DIFFICULT HAVE THESE MADE IT FOR YOU TO DO YOUR WORK, TAKE CARE OF THINGS AT HOME, OR GET ALONG WITH OTHER PEOPLE?: VERY DIFFICULT
1. FEELING NERVOUS, ANXIOUS, OR ON EDGE: MORE THAN HALF THE DAYS
6. BECOMING EASILY ANNOYED OR IRRITABLE: SEVERAL DAYS
2. NOT BEING ABLE TO STOP OR CONTROL WORRYING: MORE THAN HALF THE DAYS
GAD7 TOTAL SCORE: 11
GAD7 TOTAL SCORE: 11
IF YOU CHECKED OFF ANY PROBLEMS ON THIS QUESTIONNAIRE, HOW DIFFICULT HAVE THESE PROBLEMS MADE IT FOR YOU TO DO YOUR WORK, TAKE CARE OF THINGS AT HOME, OR GET ALONG WITH OTHER PEOPLE: VERY DIFFICULT
7. FEELING AFRAID AS IF SOMETHING AWFUL MIGHT HAPPEN: SEVERAL DAYS
3. WORRYING TOO MUCH ABOUT DIFFERENT THINGS: SEVERAL DAYS
GAD7 TOTAL SCORE: 11

## 2024-01-30 ENCOUNTER — OFFICE VISIT (OUTPATIENT)
Dept: FAMILY MEDICINE | Facility: CLINIC | Age: 24
End: 2024-01-30
Payer: COMMERCIAL

## 2024-01-30 VITALS
BODY MASS INDEX: 51.91 KG/M2 | OXYGEN SATURATION: 97 % | SYSTOLIC BLOOD PRESSURE: 125 MMHG | WEIGHT: 293 LBS | HEIGHT: 63 IN | RESPIRATION RATE: 16 BRPM | TEMPERATURE: 97.4 F | HEART RATE: 92 BPM | DIASTOLIC BLOOD PRESSURE: 87 MMHG

## 2024-01-30 DIAGNOSIS — Z76.89 ENCOUNTER TO ESTABLISH CARE: Primary | ICD-10-CM

## 2024-01-30 DIAGNOSIS — S82.892D CLOSED FRACTURE OF LEFT ANKLE WITH ROUTINE HEALING, SUBSEQUENT ENCOUNTER: ICD-10-CM

## 2024-01-30 DIAGNOSIS — F32.9 MAJOR DEPRESSIVE DISORDER, REMISSION STATUS UNSPECIFIED, UNSPECIFIED WHETHER RECURRENT: ICD-10-CM

## 2024-01-30 DIAGNOSIS — F90.9 ATTENTION DEFICIT HYPERACTIVITY DISORDER (ADHD), UNSPECIFIED ADHD TYPE: ICD-10-CM

## 2024-01-30 PROCEDURE — 99214 OFFICE O/P EST MOD 30 MIN: CPT | Performed by: PHYSICIAN ASSISTANT

## 2024-01-30 RX ORDER — METHYLPHENIDATE HYDROCHLORIDE 54 MG/1
54 TABLET ORAL DAILY
Qty: 30 TABLET | Refills: 0 | Status: SHIPPED | OUTPATIENT
Start: 2024-01-30 | End: 2024-02-29

## 2024-01-30 RX ORDER — BUPROPION HYDROCHLORIDE 300 MG/1
300 TABLET ORAL
Qty: 90 TABLET | Refills: 3 | Status: SHIPPED | OUTPATIENT
Start: 2024-01-30

## 2024-01-30 RX ORDER — METHYLPHENIDATE HYDROCHLORIDE 54 MG/1
54 TABLET ORAL DAILY
Qty: 30 TABLET | Refills: 0 | Status: SHIPPED | OUTPATIENT
Start: 2024-03-01 | End: 2024-03-31

## 2024-01-30 RX ORDER — METHYLPHENIDATE HYDROCHLORIDE 18 MG/1
18 TABLET ORAL DAILY
Qty: 30 TABLET | Refills: 0 | Status: SHIPPED | OUTPATIENT
Start: 2024-03-01 | End: 2024-03-31

## 2024-01-30 RX ORDER — METHYLPHENIDATE HYDROCHLORIDE 10 MG/1
TABLET ORAL
COMMUNITY
Start: 2017-01-29 | End: 2024-05-26

## 2024-01-30 RX ORDER — METHYLPHENIDATE HYDROCHLORIDE 18 MG/1
18 TABLET ORAL DAILY
Qty: 30 TABLET | Refills: 0 | Status: SHIPPED | OUTPATIENT
Start: 2024-01-30 | End: 2024-02-29

## 2024-01-30 RX ORDER — METHYLPHENIDATE HYDROCHLORIDE 54 MG/1
TABLET ORAL
COMMUNITY

## 2024-01-30 RX ORDER — METHYLPHENIDATE HYDROCHLORIDE 18 MG/1
18 TABLET ORAL DAILY
Qty: 30 TABLET | Refills: 0 | Status: SHIPPED | OUTPATIENT
Start: 2024-04-01 | End: 2024-05-01

## 2024-01-30 RX ORDER — METHYLPHENIDATE HYDROCHLORIDE 18 MG/1
TABLET ORAL
Status: CANCELLED | OUTPATIENT
Start: 2024-01-30

## 2024-01-30 RX ORDER — METHYLPHENIDATE HYDROCHLORIDE 10 MG/1
10 TABLET ORAL DAILY
Qty: 30 TABLET | Refills: 0 | Status: SHIPPED | OUTPATIENT
Start: 2024-01-30 | End: 2024-02-29

## 2024-01-30 RX ORDER — METHYLPHENIDATE HYDROCHLORIDE 10 MG/1
10 TABLET ORAL DAILY
Qty: 30 TABLET | Refills: 0 | Status: SHIPPED | OUTPATIENT
Start: 2024-04-01 | End: 2024-05-01

## 2024-01-30 RX ORDER — METHYLPHENIDATE HYDROCHLORIDE 54 MG/1
TABLET ORAL
Status: CANCELLED | OUTPATIENT
Start: 2024-01-30

## 2024-01-30 RX ORDER — METHYLPHENIDATE HYDROCHLORIDE 10 MG/1
10 TABLET ORAL DAILY
Qty: 30 TABLET | Refills: 0 | Status: SHIPPED | OUTPATIENT
Start: 2024-03-01 | End: 2024-03-31

## 2024-01-30 RX ORDER — METHYLPHENIDATE HYDROCHLORIDE 54 MG/1
54 TABLET ORAL DAILY
Qty: 30 TABLET | Refills: 0 | Status: SHIPPED | OUTPATIENT
Start: 2024-04-01 | End: 2024-05-01

## 2024-01-30 RX ORDER — METHYLPHENIDATE HYDROCHLORIDE 18 MG/1
TABLET ORAL
COMMUNITY

## 2024-01-30 RX ORDER — METHYLPHENIDATE HYDROCHLORIDE 10 MG/1
TABLET ORAL
Status: CANCELLED | OUTPATIENT
Start: 2024-01-30

## 2024-01-30 ASSESSMENT — PAIN SCALES - GENERAL: PAINLEVEL: NO PAIN (0)

## 2024-01-30 NOTE — LETTER
[facility]  01/30/24  Patient: Aranza Garibay  YOB: 2000  Medical Record Number: [id number]                                                                                  Non-Opioid Controlled Substance Agreement    This is an agreement between you and your provider regarding safe and appropriate use of controlled substances prescribed by your care team. Controlled substances are?medicines that can cause physical and mental dependence (abuse).     There are strict laws about having and using these medicines. We here at [facility] are  committed to working with you in your efforts to get better. To support you in this work, we'll help you schedule regular office appointments for medicine refills. If we must cancel or change your appointment for any reason, we'll make sure you have enough medicine to last until your next appointment.     As a Provider, I will:   Listen carefully to your concerns while treating you with respect.   Recommend a treatment plan that I believe is in your best interest and may involve therapies other than medicine.    Talk with you often about the possible benefits and the risk of harm of any medicine that we prescribe for you.  Assess the safety of this medicine and check how well it works.    Provide a plan on how to taper (discontinue or go off) using this medicine if the decision is made to stop its use.      ::  As a Patient, I understand controlled substances:     Are prescribed by my care provider to help me function or work and manage my condition(s).?  Are strong medicines and can cause serious side effects.     Need to be taken exactly as prescribed.?Combining controlled substances with certain medicines or chemicals (such as illegal drugs, alcohol, sedatives, sleeping pills, and benzodiazepines) can be dangerous or even fatal.? If I stop taking my medicines suddenly, I may have severe withdrawal symptoms.     The risks, benefits, and side  effects of these medicine(s) were explained to me. I agree that:    I will take part in other treatments as advised by my care team. This may be psychiatry or counseling, physical therapy, behavioral therapy, group treatment or a referral to specialist.    I will keep all my appointments and understand this is part of the monitoring of controlled substances.?My care team may require an office visit for EVERY controlled substance refill. If I miss appointments or don t follow instructions, my care team may stop my medicine    I will take my medicines as prescribed. I will not change the dose or schedule unless my care team tells me to. There will be no refills if I run out early.      I may be asked to come to the clinic and complete a urine drug test or complete a pill count. If I don t give a urine sample or participate in a pill count, the care team may stop my medicine.    I will only receive controlled substance prescriptions from this clinic. If I am treated by another provider, I will tell them that I am taking controlled substances and that I have a treatment agreement with this provider. I will inform my [facility] care team within one business day if I am given a prescription for any controlled substance by another healthcare provider. My [facility] care team can contact other providers and pharmacists about my use of any medicines.    It is up to me to make sure that I don't run out of my medicines on weekends or holidays.?If my care team is willing to refill my prescription without a visit, I must request refills only during office hours. Refills may take up to 3 business days to process. I will use one pharmacy to fill all my controlled substance prescriptions. I will notify the clinic about any changes to my insurance or medicine availability.    I am responsible for my prescriptions. If the medicine/prescription is lost, stolen or destroyed, it will not be replaced.?I also agree not to  share controlled substance medicines with anyone.     I am aware I should not use any illegal or recreational drugs. I agree not to drink alcohol unless my care team says I can.     If I enroll in the [place] Medical Cannabis program, I will tell my care team before my next refill.    I will tell my care team right away if I become pregnant, have a new medical problem treated outside of my regular clinic, or have a change in my medicines.     I understand that this medicine can affect my thinking, judgment and reaction time.? Alcohol and drugs affect the brain and body, which can affect the safety of my driving. Being under the influence of alcohol or drugs can affect my decision-making, behaviors, personal safety and the safety of others. Driving while impaired (DWI) can occur if a person is driving, operating or in physical control of a car, motorcycle, boat, snowmobile, ATV, motorbike, off-road vehicle or any other motor vehicle ([place] Statute 169A.20). I understand the risk if I choose to drive or operate any vehicle or machinery.    I understand that if I do not follow any of the conditions above, my prescriptions or treatment may be stopped or changed.   I agree that my provider, clinic care team and pharmacy may work with any city, state or federal law enforcement agency that investigates the misuse, sale or other diversion of my controlled medicine. I will allow my provider to discuss my care with, or share a copy of, this agreement with any other treating provider, pharmacy or emergency room where I receive care.     I have read this agreement and have asked questions about anything I did not understand.    ________________________________________________________  Patient Signature - Aranza Garibay     ___________________                   Date     ________________________________________________________  Provider Signature - Jose M Mari PA-C       ___________________                   Date      ________________________________________________________  Witness Signature (required if provider not present while patient signing)          ___________________                   Date

## 2024-01-30 NOTE — PROGRESS NOTES
Assessment & Plan     Encounter to establish care  Plan for annual physical in 6 months which also be her routine ADHD follow-up.    Closed fracture of left ankle with routine healing, subsequent encounter  Recovering well.  Continue PT/Ortho follow-up.     Attention deficit hyperactivity disorder (ADHD), unspecified ADHD type   reviewed.  Reviewed medication side effects expectations.  Controlled substance agreement signed.  3-month prescriptions of Ritalin and Concerta signed to pharmacy.  Wellbutrin side for 1 year.  6-month follow-up for physical/ADHD.  - buPROPion (WELLBUTRIN XL) 300 MG 24 hr tablet  Dispense: 90 tablet; Refill: 3  - methylphenidate (RITALIN) 10 MG tablet  Dispense: 30 tablet; Refill: 0  - methylphenidate (RITALIN) 10 MG tablet  Dispense: 30 tablet; Refill: 0  - methylphenidate (RITALIN) 10 MG tablet  Dispense: 30 tablet; Refill: 0  - methylphenidate HCl ER, OSM, (CONCERTA) 18 MG CR tablet  Dispense: 30 tablet; Refill: 0  - methylphenidate HCl ER, OSM, (CONCERTA) 18 MG CR tablet  Dispense: 30 tablet; Refill: 0  - methylphenidate HCl ER, OSM, (CONCERTA) 18 MG CR tablet  Dispense: 30 tablet; Refill: 0  - methylphenidate HCl ER, OSM, (CONCERTA) 54 MG CR tablet  Dispense: 30 tablet; Refill: 0  - methylphenidate HCl ER, OSM, (CONCERTA) 54 MG CR tablet  Dispense: 30 tablet; Refill: 0  - methylphenidate HCl ER, OSM, (CONCERTA) 54 MG CR tablet  Dispense: 30 tablet; Refill: 0    Major depressive disorder, remission status unspecified, unspecified whether recurrent  Option to see psychiatry/therapist in the future.  Continue Wellbutrin 300 mg.  Healthy diet and exercise.  - buPROPion (WELLBUTRIN XL) 300 MG 24 hr tablet  Dispense: 90 tablet; Refill: 3      30 minutes spent by me on the date of the encounter doing chart review, review of outside records, review of test results, interpretation of tests, patient visit, and documentation       BMI  Estimated body mass index is 59.77 kg/m  as calculated  "from the following:    Height as of this encounter: 1.6 m (5' 3\").    Weight as of this encounter: 153 kg (337 lb 6.4 oz).   Weight management plan: Discussed healthy diet and exercise guidelines    Depression Screening Follow Up        1/29/2024     8:03 PM   PHQ   PHQ-9 Total Score 15   Q9: Thoughts of better off dead/self-harm past 2 weeks Several days   F/U: Thoughts of suicide or self-harm No   F/U: Safety concerns No       Follow Up      Follow Up Actions Taken  Crisis resource information provided in the After Visit Summary  Patient to follow up with PCP.  Clinic staff to schedule appointment if able.    Discussed the following ways the patient can remain in a safe environment:    Connie Cobian is a 24 year old, presenting for the following health issues:  Recheck Medication    Here today to establish care.    This is our second time meeting.  Initially met in November for preoperative clearance for a left ankle fracture.  She is recovering well from this.    Originally from [place].  Went to [facility] [place].  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    Past medical history significant for ADHD which was diagnosed in 2016/2017.  Takes a combined 72 mg (54 + 18 mg) of Concerta in the morning + additional Ritalin 10 mg in the afternoon as needed.  Notes significant improvement in daily function with this.  Has been out of this for a number of months due to transition of care to the [facility] + managing ankle fracture.    On Wellbutrin 300 mg daily for depression.  Does not currently see a psychiatrist or therapist.  Did do a genetic test in the past for antidepressants.  Previously on Effexor which she did not metabolize per test.    Scheduled for Pap smear with female provider upcoming.    History of Present Illness       Mental Health Follow-up:  Patient presents to follow-up on Depression & Anxiety.Patient's depression since last visit has been:  Medium  The patient is having other " "symptoms associated with depression.  Patient's anxiety since last visit has been:  Medium  The patient is not having other symptoms associated with anxiety.  Any significant life events: financial concerns, health concerns and other  Patient is feeling anxious or having panic attacks.  Patient has no concerns about alcohol or drug use.    She eats 2-3 servings of fruits and vegetables daily.She consumes 0 sweetened beverage(s) daily.She exercises with enough effort to increase her heart rate 10 to 19 minutes per day.  She exercises with enough effort to increase her heart rate 3 or less days per week. She is missing 2 dose(s) of medications per week.  She is not taking prescribed medications regularly due to remembering to take.           Review of Systems  Constitutional, neuro, ENT, endocrine, pulmonary, cardiac, gastrointestinal, genitourinary, musculoskeletal, integument and psychiatric systems are negative, except as otherwise noted.      Objective    /87 (BP Location: Right arm, Patient Position: Sitting, Cuff Size: Adult Large)   Pulse 92   Temp 97.4  F (36.3  C) (Temporal)   Resp 16   Ht 1.6 m (5' 3\")   Wt (!) 153 kg (337 lb 6.4 oz)   SpO2 97%   BMI 59.77 kg/m    Body mass index is 59.77 kg/m .  Physical Exam   EXAM:  GENERAL APPEARANCE: healthy, alert and no distress  EYES: Eyes grossly normal to inspection, PERRL and conjunctivae and sclerae normal  NECK: no asymmetry, masses, or scars  RESP: lungs clear to auscultation - no rales, rhonchi or wheezes  CV: regular rates and rhythm, normal S1 S2, no S3 or S4 and no murmur, click or rub  MS: extremities normal, CAM boot left ankle.   SKIN: no suspicious lesions or rashes  NEURO: Normal strength and tone, mentation intact and speech normal  PSYCH: mentation appears normal and affect normal      The likelihood of other entities in the differential is insufficient to justify any further testing for them at this time. This was explained to the " patient. The patient was advised that persistent or worsening symptoms would require further evaluation. Patient advised to call the office and if unable to reach to go to the emergency room if they develop any new or worsening symptoms. Expressed understanding and agreement with above stated plan.       Signed Electronically by: Jose M Mari PA-C

## 2024-02-20 ENCOUNTER — TELEPHONE (OUTPATIENT)
Dept: FAMILY MEDICINE | Facility: CLINIC | Age: 24
End: 2024-02-20
Payer: COMMERCIAL

## 2024-02-20 NOTE — TELEPHONE ENCOUNTER
CC: Patient calling with questions about upcoming appt:    3/4/2024 3:30 PM (Arrive by 3:10 PM) Flavia Davila DNP [facility] CS     Patient scheduled for above appt for pap/birth control. Wondering if she can get an IUD placed at the above appt? Routing to provider to please review and advise - thank you!    Please respond with provider's message to patient via ari Issa RN  [facility]

## 2024-03-04 ENCOUNTER — OFFICE VISIT (OUTPATIENT)
Dept: OBGYN | Facility: CLINIC | Age: 24
End: 2024-03-04
Payer: COMMERCIAL

## 2024-03-04 VITALS
SYSTOLIC BLOOD PRESSURE: 126 MMHG | BODY MASS INDEX: 56.12 KG/M2 | TEMPERATURE: 96.6 F | HEART RATE: 101 BPM | WEIGHT: 293 LBS | DIASTOLIC BLOOD PRESSURE: 60 MMHG

## 2024-03-04 DIAGNOSIS — Z12.4 SCREENING FOR CERVICAL CANCER: ICD-10-CM

## 2024-03-04 DIAGNOSIS — Z30.430 ENCOUNTER FOR INSERTION OF INTRAUTERINE CONTRACEPTIVE DEVICE: Primary | ICD-10-CM

## 2024-03-04 DIAGNOSIS — Z11.3 SCREEN FOR STD (SEXUALLY TRANSMITTED DISEASE): ICD-10-CM

## 2024-03-04 LAB — HCG UR QL: NEGATIVE

## 2024-03-04 PROCEDURE — 87591 N.GONORRHOEAE DNA AMP PROB: CPT | Performed by: OBSTETRICS & GYNECOLOGY

## 2024-03-04 PROCEDURE — 99202 OFFICE O/P NEW SF 15 MIN: CPT | Mod: 25 | Performed by: OBSTETRICS & GYNECOLOGY

## 2024-03-04 PROCEDURE — 58300 INSERT INTRAUTERINE DEVICE: CPT | Performed by: OBSTETRICS & GYNECOLOGY

## 2024-03-04 PROCEDURE — 87491 CHLMYD TRACH DNA AMP PROBE: CPT | Performed by: OBSTETRICS & GYNECOLOGY

## 2024-03-04 PROCEDURE — 81025 URINE PREGNANCY TEST: CPT | Performed by: OBSTETRICS & GYNECOLOGY

## 2024-03-04 PROCEDURE — G0145 SCR C/V CYTO,THINLAYER,RESCR: HCPCS | Performed by: OBSTETRICS & GYNECOLOGY

## 2024-03-04 NOTE — PATIENT INSTRUCTIONS

## 2024-03-04 NOTE — PROGRESS NOTES
IUD Insertion:  CONSULT:    Is a pregnancy test required: Yes.  Was it positive or negative?  Negative  Was a consent obtained?  Yes    Subjective: Aranza Garibay is a 24 year old presents for IUD and desires Mirena type IUD.  Originally she wanted to have ParaGard but given her nulliparous status,discussed Mirena might be a better fit for her.  She requested a Mirena instead.    Aranza Garibay understands she may have the IUD removed at any time. IUD should be removed by a health care provider.    The entire insertion procedure was reviewed with the patient, including care after placement.    Patient's last menstrual period was 02/11/2024.  No allergy to betadine or shellfish. Patient desires STD screening  hCG Urine Qualitative   Date Value Ref Range Status   03/04/2024 Negative Negative Final     Comment:     This test is for screening purposes.  Results should be interpreted along with the clinical picture.  Confirmation testing is available if warranted by ordering CUR397, HCG Quantitative Pregnancy.         /60   Pulse 101   Temp (!) 96.6  F (35.9  C) (Oral)   Wt 143.7 kg (316 lb 12.8 oz)   LMP 02/11/2024   Breastfeeding No   BMI 56.12 kg/m      Pelvic Exam:   EG/BUS: normal genital architecture without lesions, erythema or abnormal secretions.   Vagina: moist, pink, rugae with physiologic discharge and secretions  Cervix: nulliparous no lesions and pink, moist, closed, without lesion or CMT  Uterus: mid position, normal size  Adnexa: within normal limits and no masses, nodularity, tenderness    PROCEDURE NOTE: -- IUD Insertion    Reason for Insertion: contraception    Paracervical block performed.  Under sterile technique, cervix was visualized with speculum and prepped with Betadine solution swab x 3. Tenaculum was placed for stability.  The cervix was closed tight and required dilation with the blue os finder.  The uterus was gently straightened and sounded to 7.5 cm. IUD prepared for placement,  "and IUD inserted according to 's instructions without difficulty or significant resitance, and deployed at the fundus. The strings were visualized and trimmed to 2.5 cm from the external os. Tenaculum was removed and hemostasis noted. Speculum removed.  Patient tolerated procedure well.    Lot # see nursing note  Exp: \"    EBL: minimal    Complications: none    ASSESSMENT:     ICD-10-CM    1. Encounter for insertion of intrauterine contraceptive device  Z30.430 levonorgestrel (MIRENA) 52 MG (20 mcg/day) IUD 1 each     levonorgestrel (MIRENA) 52 MG (20 mcg/day) IUD 1 each     INSERTION INTRAUTERINE DEVICE      2. Screening for cervical cancer  Z12.4 Pap screen only - recommended age 21 - 24 years      3. Screen for STD (sexually transmitted disease)  Z11.3 Chlamydia & Gonorrhea by PCR, [facility] - Clinic Collect             PLAN:  Pap smear done and sent today.   Std screen done    discussed side effects and normal expectations following IUD insertion.   The patient should return to clinic for a speculum examination for confirmation that the IUD is in place in one month. Bleeding pattern of this particular IUD was discussed with the patient.    Recommend a pelvic ultrasound to confirm correct placement at the fundus give difficulty with cervical dilation.       Cyndie Giordano MD      "

## 2024-03-06 LAB
C TRACH DNA SPEC QL PROBE+SIG AMP: NEGATIVE
N GONORRHOEA DNA SPEC QL NAA+PROBE: NEGATIVE

## 2024-03-08 LAB
BKR LAB AP GYN ADEQUACY: NORMAL
BKR LAB AP GYN INTERPRETATION: NORMAL
BKR LAB AP HPV REFLEX: NO
BKR LAB AP LMP: NORMAL
BKR LAB AP PREVIOUS ABNORMAL: NORMAL
PATH REPORT.COMMENTS IMP SPEC: NORMAL
PATH REPORT.COMMENTS IMP SPEC: NORMAL
PATH REPORT.RELEVANT HX SPEC: NORMAL

## 2024-03-11 ENCOUNTER — ANCILLARY PROCEDURE (OUTPATIENT)
Dept: ULTRASOUND IMAGING | Facility: CLINIC | Age: 24
End: 2024-03-11
Payer: COMMERCIAL

## 2024-03-11 PROCEDURE — 76856 US EXAM PELVIC COMPLETE: CPT | Performed by: OBSTETRICS & GYNECOLOGY

## 2024-03-11 PROCEDURE — 76830 TRANSVAGINAL US NON-OB: CPT | Performed by: OBSTETRICS & GYNECOLOGY

## 2024-03-21 ENCOUNTER — MYC REFILL (OUTPATIENT)
Dept: FAMILY MEDICINE | Facility: CLINIC | Age: 24
End: 2024-03-21
Payer: COMMERCIAL

## 2024-03-21 DIAGNOSIS — F90.9 ATTENTION DEFICIT HYPERACTIVITY DISORDER (ADHD), UNSPECIFIED ADHD TYPE: Primary | ICD-10-CM

## 2024-03-21 RX ORDER — METHYLPHENIDATE HYDROCHLORIDE 54 MG/1
54 TABLET ORAL DAILY
Qty: 30 TABLET | Refills: 0 | Status: SHIPPED | OUTPATIENT
Start: 2024-04-21 | End: 2024-05-26

## 2024-03-21 RX ORDER — METHYLPHENIDATE HYDROCHLORIDE 54 MG/1
TABLET ORAL
Status: CANCELLED | OUTPATIENT
Start: 2024-03-21

## 2024-03-21 RX ORDER — METHYLPHENIDATE HYDROCHLORIDE 54 MG/1
54 TABLET ORAL DAILY
Qty: 30 TABLET | Refills: 0 | Status: SHIPPED | OUTPATIENT
Start: 2024-05-22 | End: 2024-06-21

## 2024-03-21 RX ORDER — METHYLPHENIDATE HYDROCHLORIDE 54 MG/1
54 TABLET ORAL DAILY
Qty: 30 TABLET | Refills: 0 | Status: SHIPPED | OUTPATIENT
Start: 2024-03-21 | End: 2024-04-20

## 2024-05-23 ENCOUNTER — HOSPITAL ENCOUNTER (EMERGENCY)
Facility: CLINIC | Age: 24
Discharge: HOME | End: 2024-05-23
Attending: EMERGENCY MEDICINE | Admitting: EMERGENCY MEDICINE
Payer: COMMERCIAL

## 2024-05-23 ENCOUNTER — NURSE TRIAGE (OUTPATIENT)
Dept: NURSING | Facility: CLINIC | Age: 24
End: 2024-05-23
Payer: COMMERCIAL

## 2024-05-23 DIAGNOSIS — S70.12XA TRAUMATIC ECCHYMOSIS OF LEFT THIGH, INITIAL ENCOUNTER: ICD-10-CM

## 2024-05-23 PROCEDURE — 99282 EMERGENCY DEPT VISIT SF MDM: CPT

## 2024-05-23 PROCEDURE — 99283 EMERGENCY DEPT VISIT LOW MDM: CPT

## 2024-05-23 ASSESSMENT — COLUMBIA-SUICIDE SEVERITY RATING SCALE - C-SSRS
6. HAVE YOU EVER DONE ANYTHING, STARTED TO DO ANYTHING, OR PREPARED TO DO ANYTHING TO END YOUR LIFE?: YES
1. IN THE PAST MONTH, HAVE YOU WISHED YOU WERE DEAD OR WISHED YOU COULD GO TO SLEEP AND NOT WAKE UP?: NO
2. HAVE YOU ACTUALLY HAD ANY THOUGHTS OF KILLING YOURSELF IN THE PAST MONTH?: NO

## 2024-05-24 VITALS
SYSTOLIC BLOOD PRESSURE: 150 MMHG | OXYGEN SATURATION: 98 % | HEART RATE: 92 BPM | RESPIRATION RATE: 18 BRPM | DIASTOLIC BLOOD PRESSURE: 97 MMHG | TEMPERATURE: 98.8 F
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
## 2024-05-24 NOTE — DISCHARGE INSTRUCTIONS
Recommend ice/Tylenol/Motrin as needed.  Elevate left lower extremity.  It will not be unusual for the bruise you sustained to spread throughout your thigh and possibly even to your lower leg due to gravity.  Monitor for fever, increasing redness at the site, increasing pain, firmness to your upper thigh.
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
## 2024-05-26 ENCOUNTER — MYC REFILL (OUTPATIENT)
Dept: FAMILY MEDICINE | Facility: CLINIC | Age: 24
End: 2024-05-26
Payer: COMMERCIAL

## 2024-05-26 DIAGNOSIS — F90.9 ATTENTION DEFICIT HYPERACTIVITY DISORDER (ADHD), UNSPECIFIED ADHD TYPE: ICD-10-CM

## 2024-05-28 RX ORDER — METHYLPHENIDATE HYDROCHLORIDE 10 MG/1
10 TABLET ORAL DAILY
Qty: 30 TABLET | Refills: 0 | Status: SHIPPED | OUTPATIENT
Start: 2024-05-28

## 2024-05-28 RX ORDER — METHYLPHENIDATE HYDROCHLORIDE 54 MG/1
54 TABLET ORAL DAILY
Qty: 30 TABLET | Refills: 0 | Status: SHIPPED | OUTPATIENT
Start: 2024-05-28

## 2024-05-31 ENCOUNTER — HOSPITAL ENCOUNTER (EMERGENCY)
Facility: CLINIC | Age: 24
Discharge: HOME | End: 2024-06-01
Attending: EMERGENCY MEDICINE | Admitting: EMERGENCY MEDICINE
Payer: COMMERCIAL

## 2024-05-31 ENCOUNTER — NURSE TRIAGE (OUTPATIENT)
Dept: NURSING | Facility: CLINIC | Age: 24
End: 2024-05-31
Payer: COMMERCIAL

## 2024-05-31 VITALS
HEART RATE: 94 BPM | BODY MASS INDEX: 56.55 KG/M2 | RESPIRATION RATE: 20 BRPM | OXYGEN SATURATION: 97 % | TEMPERATURE: 99.3 F | SYSTOLIC BLOOD PRESSURE: 132 MMHG | WEIGHT: 293 LBS | DIASTOLIC BLOOD PRESSURE: 105 MMHG

## 2024-05-31 DIAGNOSIS — S70.12XA THIGH HEMATOMA, LEFT, INITIAL ENCOUNTER: ICD-10-CM

## 2024-05-31 LAB
BASOPHILS # BLD AUTO: 0 10E3/UL (ref 0–0.2)
BASOPHILS NFR BLD AUTO: 0 %
EOSINOPHIL # BLD AUTO: 0.2 10E3/UL (ref 0–0.7)
EOSINOPHIL NFR BLD AUTO: 2 %
ERYTHROCYTE [DISTWIDTH] IN BLOOD BY AUTOMATED COUNT: 12.9 % (ref 10–15)
HCT VFR BLD AUTO: 37.7 % (ref 35–47)
HGB BLD-MCNC: 12.7 G/DL (ref 11.7–15.7)
IMM GRANULOCYTES # BLD: 0 10E3/UL
IMM GRANULOCYTES NFR BLD: 0 %
LYMPHOCYTES # BLD AUTO: 3 10E3/UL (ref 0.8–5.3)
LYMPHOCYTES NFR BLD AUTO: 33 %
MCH RBC QN AUTO: 28.6 PG (ref 26.5–33)
MCHC RBC AUTO-ENTMCNC: 33.7 G/DL (ref 31.5–36.5)
MCV RBC AUTO: 85 FL (ref 78–100)
MONOCYTES # BLD AUTO: 0.5 10E3/UL (ref 0–1.3)
MONOCYTES NFR BLD AUTO: 6 %
NEUTROPHILS # BLD AUTO: 5.3 10E3/UL (ref 1.6–8.3)
NEUTROPHILS NFR BLD AUTO: 59 %
NRBC # BLD AUTO: 0 10E3/UL
NRBC BLD AUTO-RTO: 0 /100
PLATELET # BLD AUTO: 343 10E3/UL (ref 150–450)
RBC # BLD AUTO: 4.44 10E6/UL (ref 3.8–5.2)
WBC # BLD AUTO: 9 10E3/UL (ref 4–11)

## 2024-05-31 PROCEDURE — 84703 CHORIONIC GONADOTROPIN ASSAY: CPT | Performed by: EMERGENCY MEDICINE

## 2024-05-31 PROCEDURE — 85025 COMPLETE CBC W/AUTO DIFF WBC: CPT | Performed by: EMERGENCY MEDICINE

## 2024-05-31 PROCEDURE — 258N000003 HC RX IP 258 OP 636: Performed by: EMERGENCY MEDICINE

## 2024-05-31 PROCEDURE — 36415 COLL VENOUS BLD VENIPUNCTURE: CPT | Performed by: EMERGENCY MEDICINE

## 2024-05-31 PROCEDURE — 96361 HYDRATE IV INFUSION ADD-ON: CPT

## 2024-05-31 PROCEDURE — 96360 HYDRATION IV INFUSION INIT: CPT | Mod: 59

## 2024-05-31 PROCEDURE — 80048 BASIC METABOLIC PNL TOTAL CA: CPT | Performed by: EMERGENCY MEDICINE

## 2024-05-31 PROCEDURE — 99285 EMERGENCY DEPT VISIT HI MDM: CPT | Mod: 25

## 2024-05-31 RX ADMIN — SODIUM CHLORIDE 1000 ML: 9 INJECTION, SOLUTION INTRAVENOUS at 23:38

## 2024-05-31 SDOH — HEALTH STABILITY: PHYSICAL HEALTH: ON AVERAGE, HOW MANY DAYS PER WEEK DO YOU ENGAGE IN MODERATE TO STRENUOUS EXERCISE (LIKE A BRISK WALK)?: 2 DAYS

## 2024-05-31 SDOH — HEALTH STABILITY: PHYSICAL HEALTH: ON AVERAGE, HOW MANY MINUTES DO YOU ENGAGE IN EXERCISE AT THIS LEVEL?: 20 MIN

## 2024-05-31 ASSESSMENT — ACTIVITIES OF DAILY LIVING (ADL): ADLS_ACUITY_SCORE: 36

## 2024-05-31 ASSESSMENT — COLUMBIA-SUICIDE SEVERITY RATING SCALE - C-SSRS
6. HAVE YOU EVER DONE ANYTHING, STARTED TO DO ANYTHING, OR PREPARED TO DO ANYTHING TO END YOUR LIFE?: NO
1. IN THE PAST MONTH, HAVE YOU WISHED YOU WERE DEAD OR WISHED YOU COULD GO TO SLEEP AND NOT WAKE UP?: NO
2. HAVE YOU ACTUALLY HAD ANY THOUGHTS OF KILLING YOURSELF IN THE PAST MONTH?: NO

## 2024-05-31 ASSESSMENT — SOCIAL DETERMINANTS OF HEALTH (SDOH): HOW OFTEN DO YOU GET TOGETHER WITH FRIENDS OR RELATIVES?: TWICE A WEEK

## 2024-06-01 ENCOUNTER — APPOINTMENT (OUTPATIENT)
Dept: CT IMAGING | Facility: CLINIC | Age: 24
End: 2024-06-01
Attending: EMERGENCY MEDICINE
Payer: COMMERCIAL

## 2024-06-01 ENCOUNTER — APPOINTMENT (OUTPATIENT)
Dept: ULTRASOUND IMAGING | Facility: CLINIC | Age: 24
End: 2024-06-01
Attending: EMERGENCY MEDICINE
Payer: COMMERCIAL

## 2024-06-01 LAB
ANION GAP SERPL CALCULATED.3IONS-SCNC: 13 MMOL/L (ref 7–15)
BUN SERPL-MCNC: 7.2 MG/DL (ref 6–20)
CALCIUM SERPL-MCNC: 9 MG/DL (ref 8.6–10)
CHLORIDE SERPL-SCNC: 104 MMOL/L (ref 98–107)
CREAT SERPL-MCNC: 0.68 MG/DL (ref 0.51–0.95)
DEPRECATED HCO3 PLAS-SCNC: 22 MMOL/L (ref 22–29)
EGFRCR SERPLBLD CKD-EPI 2021: >90 ML/MIN/1.73M2
GLUCOSE SERPL-MCNC: 97 MG/DL (ref 70–99)
HCG SERPL QL: NEGATIVE
HOLD SPECIMEN: NORMAL
POTASSIUM SERPL-SCNC: 4.1 MMOL/L (ref 3.4–5.3)
SODIUM SERPL-SCNC: 139 MMOL/L (ref 135–145)

## 2024-06-01 PROCEDURE — 93971 EXTREMITY STUDY: CPT | Mod: LT

## 2024-06-01 PROCEDURE — 250N000011 HC RX IP 250 OP 636: Performed by: EMERGENCY MEDICINE

## 2024-06-01 PROCEDURE — 73701 CT LOWER EXTREMITY W/DYE: CPT | Mod: LT

## 2024-06-01 RX ORDER — IOPAMIDOL 755 MG/ML
500 INJECTION, SOLUTION INTRAVASCULAR ONCE
Status: COMPLETED | OUTPATIENT
Start: 2024-06-01 | End: 2024-06-01

## 2024-06-01 RX ADMIN — IOPAMIDOL 100 ML: 755 INJECTION, SOLUTION INTRAVENOUS at 00:45

## 2024-06-01 ASSESSMENT — ACTIVITIES OF DAILY LIVING (ADL): ADLS_ACUITY_SCORE: 38
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
## 2024-06-01 NOTE — ED NOTES
Discharge instructions reviewed. Patient verbalized understanding. Able to ambulate to lobby without issues. Family at bedside.
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
## 2024-06-05 ENCOUNTER — OFFICE VISIT (OUTPATIENT)
Dept: FAMILY MEDICINE | Facility: CLINIC | Age: 24
End: 2024-06-05
Payer: COMMERCIAL

## 2024-06-05 VITALS
BODY MASS INDEX: 50.02 KG/M2 | DIASTOLIC BLOOD PRESSURE: 80 MMHG | SYSTOLIC BLOOD PRESSURE: 117 MMHG | OXYGEN SATURATION: 96 % | HEIGHT: 64 IN | HEART RATE: 96 BPM | TEMPERATURE: 98.5 F | WEIGHT: 293 LBS | RESPIRATION RATE: 16 BRPM

## 2024-06-05 DIAGNOSIS — F32.9 MAJOR DEPRESSIVE DISORDER, REMISSION STATUS UNSPECIFIED, UNSPECIFIED WHETHER RECURRENT: ICD-10-CM

## 2024-06-05 DIAGNOSIS — Z11.3 SCREEN FOR STD (SEXUALLY TRANSMITTED DISEASE): ICD-10-CM

## 2024-06-05 DIAGNOSIS — Z11.59 NEED FOR HEPATITIS C SCREENING TEST: ICD-10-CM

## 2024-06-05 DIAGNOSIS — Z00.00 ANNUAL PHYSICAL EXAM: Primary | ICD-10-CM

## 2024-06-05 DIAGNOSIS — Z98.890 STATUS POST OPEN REDUCTION WITH INTERNAL FIXATION (ORIF) OF FRACTURE OF ANKLE: ICD-10-CM

## 2024-06-05 DIAGNOSIS — Z11.4 SCREENING FOR HIV (HUMAN IMMUNODEFICIENCY VIRUS): ICD-10-CM

## 2024-06-05 DIAGNOSIS — Z87.81 STATUS POST OPEN REDUCTION WITH INTERNAL FIXATION (ORIF) OF FRACTURE OF ANKLE: ICD-10-CM

## 2024-06-05 DIAGNOSIS — F90.9 ATTENTION DEFICIT HYPERACTIVITY DISORDER (ADHD), UNSPECIFIED ADHD TYPE: ICD-10-CM

## 2024-06-05 DIAGNOSIS — S70.12XD HEMATOMA OF LEFT THIGH, SUBSEQUENT ENCOUNTER: ICD-10-CM

## 2024-06-05 DIAGNOSIS — Z13.6 CARDIOVASCULAR SCREENING; LDL GOAL LESS THAN 130: ICD-10-CM

## 2024-06-05 PROCEDURE — 80061 LIPID PANEL: CPT | Performed by: PHYSICIAN ASSISTANT

## 2024-06-05 PROCEDURE — 36415 COLL VENOUS BLD VENIPUNCTURE: CPT | Performed by: PHYSICIAN ASSISTANT

## 2024-06-05 PROCEDURE — 87389 HIV-1 AG W/HIV-1&-2 AB AG IA: CPT | Performed by: PHYSICIAN ASSISTANT

## 2024-06-05 PROCEDURE — 86803 HEPATITIS C AB TEST: CPT | Performed by: PHYSICIAN ASSISTANT

## 2024-06-05 PROCEDURE — 99214 OFFICE O/P EST MOD 30 MIN: CPT | Mod: 25 | Performed by: PHYSICIAN ASSISTANT

## 2024-06-05 PROCEDURE — 84443 ASSAY THYROID STIM HORMONE: CPT | Performed by: PHYSICIAN ASSISTANT

## 2024-06-05 PROCEDURE — 99395 PREV VISIT EST AGE 18-39: CPT | Performed by: PHYSICIAN ASSISTANT

## 2024-06-05 ASSESSMENT — PATIENT HEALTH QUESTIONNAIRE - PHQ9: SUM OF ALL RESPONSES TO PHQ QUESTIONS 1-9: 13

## 2024-06-05 ASSESSMENT — PAIN SCALES - GENERAL: PAINLEVEL: SEVERE PAIN (7)

## 2024-06-05 NOTE — PATIENT INSTRUCTIONS
MENTAL HEALTH Resources    [facility]: [phone]    [facility]: [phone]     [facility]: [phone]    [facility]: [phone]    [facility]: [phone]     These groups including psychiatrists/nurse practioners for medication management AND therapists for couseling
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
## 2024-06-06 LAB
CHOLEST SERPL-MCNC: 147 MG/DL
FASTING STATUS PATIENT QL REPORTED: YES
HCV AB SERPL QL IA: NONREACTIVE
HDLC SERPL-MCNC: 55 MG/DL
HIV 1+2 AB+HIV1 P24 AG SERPL QL IA: NONREACTIVE
LDLC SERPL CALC-MCNC: 80 MG/DL
NONHDLC SERPL-MCNC: 92 MG/DL
TRIGL SERPL-MCNC: 60 MG/DL
TSH SERPL DL<=0.005 MIU/L-ACNC: 3.41 UIU/ML (ref 0.3–4.2)

## 2024-06-06 NOTE — RESULT ENCOUNTER NOTE
Rah Cobian,     Nice seeing you yesterday!     -Cholesterol levels are satisfactory    -TSH (thyroid stimulating hormone) level is normal which indicates normal circulating thyroid hormone levels.     -Negative Hep C/HIV screening!     Let me know if you have any questions or concerns,     Jose M Mari PA-C  [facility]

## 2024-09-24 ENCOUNTER — MYC MEDICAL ADVICE (OUTPATIENT)
Dept: FAMILY MEDICINE | Facility: CLINIC | Age: 24
End: 2024-09-24
Payer: COMMERCIAL

## 2024-09-24 DIAGNOSIS — F32.9 MAJOR DEPRESSIVE DISORDER, REMISSION STATUS UNSPECIFIED, UNSPECIFIED WHETHER RECURRENT: Primary | ICD-10-CM

## 2024-09-24 NOTE — TELEPHONE ENCOUNTER
Krish Salguero,      Please review patient's MyChart message and advise.      Yuli HAGAN MA on 9/24/2024 at 9:43 AM

## 2024-10-29 ENCOUNTER — MYC REFILL (OUTPATIENT)
Dept: FAMILY MEDICINE | Facility: CLINIC | Age: 24
End: 2024-10-29
Payer: COMMERCIAL

## 2024-10-29 DIAGNOSIS — F90.9 ATTENTION DEFICIT HYPERACTIVITY DISORDER (ADHD), UNSPECIFIED ADHD TYPE: ICD-10-CM

## 2024-10-29 RX ORDER — METHYLPHENIDATE HYDROCHLORIDE 54 MG/1
54 TABLET ORAL DAILY
Qty: 30 TABLET | Refills: 0 | Status: SHIPPED | OUTPATIENT
Start: 2024-10-29

## 2025-02-03 ENCOUNTER — MYC REFILL (OUTPATIENT)
Dept: FAMILY MEDICINE | Facility: CLINIC | Age: 25
End: 2025-02-03
Payer: COMMERCIAL

## 2025-02-03 DIAGNOSIS — F32.9 MAJOR DEPRESSIVE DISORDER, REMISSION STATUS UNSPECIFIED, UNSPECIFIED WHETHER RECURRENT: ICD-10-CM

## 2025-02-03 DIAGNOSIS — F90.9 ATTENTION DEFICIT HYPERACTIVITY DISORDER (ADHD), UNSPECIFIED ADHD TYPE: ICD-10-CM

## 2025-02-03 RX ORDER — BUPROPION HYDROCHLORIDE 300 MG/1
300 TABLET ORAL
Qty: 90 TABLET | Refills: 1 | Status: SHIPPED | OUTPATIENT
Start: 2025-02-03

## 2025-03-10 ENCOUNTER — MYC REFILL (OUTPATIENT)
Dept: FAMILY MEDICINE | Facility: CLINIC | Age: 25
End: 2025-03-10
Payer: COMMERCIAL

## 2025-03-10 ENCOUNTER — MYC MEDICAL ADVICE (OUTPATIENT)
Dept: FAMILY MEDICINE | Facility: CLINIC | Age: 25
End: 2025-03-10
Payer: COMMERCIAL

## 2025-03-10 DIAGNOSIS — F90.9 ATTENTION DEFICIT HYPERACTIVITY DISORDER (ADHD), UNSPECIFIED ADHD TYPE: ICD-10-CM

## 2025-03-10 RX ORDER — METHYLPHENIDATE HYDROCHLORIDE 54 MG/1
54 TABLET ORAL DAILY
Qty: 30 TABLET | Refills: 0 | Status: SHIPPED | OUTPATIENT
Start: 2025-03-10

## 2025-03-10 RX ORDER — METHYLPHENIDATE HYDROCHLORIDE 54 MG/1
54 TABLET ORAL DAILY
Qty: 30 TABLET | Refills: 0 | Status: SHIPPED | OUTPATIENT
Start: 2025-03-10 | End: 2025-03-10

## 2025-03-10 NOTE — TELEPHONE ENCOUNTER
To PCP:    Patient requesting pharmacy change for her Concerta.    Medication and pharmacy pended for review.    Thank you.    Rajesh Buckner RN  [facility]

## 2025-04-28 ENCOUNTER — MYC REFILL (OUTPATIENT)
Dept: FAMILY MEDICINE | Facility: CLINIC | Age: 25
End: 2025-04-28
Payer: COMMERCIAL

## 2025-04-28 DIAGNOSIS — F90.9 ATTENTION DEFICIT HYPERACTIVITY DISORDER (ADHD), UNSPECIFIED ADHD TYPE: ICD-10-CM

## 2025-04-28 RX ORDER — METHYLPHENIDATE HYDROCHLORIDE 54 MG/1
54 TABLET ORAL DAILY
Qty: 30 TABLET | Refills: 0 | Status: SHIPPED | OUTPATIENT
Start: 2025-04-28

## 2025-05-06 ENCOUNTER — PATIENT OUTREACH (OUTPATIENT)
Dept: CARE COORDINATION | Facility: CLINIC | Age: 25
End: 2025-05-06
Payer: COMMERCIAL

## 2025-05-13 ENCOUNTER — MYC REFILL (OUTPATIENT)
Dept: FAMILY MEDICINE | Facility: CLINIC | Age: 25
End: 2025-05-13
Payer: COMMERCIAL

## 2025-05-13 DIAGNOSIS — F32.9 MAJOR DEPRESSIVE DISORDER, REMISSION STATUS UNSPECIFIED, UNSPECIFIED WHETHER RECURRENT: ICD-10-CM

## 2025-05-13 DIAGNOSIS — F90.9 ATTENTION DEFICIT HYPERACTIVITY DISORDER (ADHD), UNSPECIFIED ADHD TYPE: ICD-10-CM

## 2025-05-13 RX ORDER — BUPROPION HYDROCHLORIDE 300 MG/1
300 TABLET ORAL
Qty: 90 TABLET | Refills: 1 | Status: CANCELLED | OUTPATIENT
Start: 2025-05-13

## 2025-05-13 RX ORDER — BUPROPION HYDROCHLORIDE 300 MG/1
300 TABLET ORAL
Qty: 90 TABLET | Refills: 0 | Status: SHIPPED | OUTPATIENT
Start: 2025-05-13

## 2025-05-13 NOTE — TELEPHONE ENCOUNTER
Refill change for existing refill.     Thank you,  Nikhil, Triage PADMINI Carreon    1:11 PM 5/13/2025

## 2025-06-06 SDOH — HEALTH STABILITY: PHYSICAL HEALTH: ON AVERAGE, HOW MANY DAYS PER WEEK DO YOU ENGAGE IN MODERATE TO STRENUOUS EXERCISE (LIKE A BRISK WALK)?: 1 DAY

## 2025-06-06 SDOH — HEALTH STABILITY: PHYSICAL HEALTH: ON AVERAGE, HOW MANY MINUTES DO YOU ENGAGE IN EXERCISE AT THIS LEVEL?: 70 MIN

## 2025-06-06 ASSESSMENT — SOCIAL DETERMINANTS OF HEALTH (SDOH): HOW OFTEN DO YOU GET TOGETHER WITH FRIENDS OR RELATIVES?: THREE TIMES A WEEK

## 2025-06-11 ENCOUNTER — OFFICE VISIT (OUTPATIENT)
Dept: FAMILY MEDICINE | Facility: CLINIC | Age: 25
End: 2025-06-11
Payer: COMMERCIAL

## 2025-06-11 VITALS
OXYGEN SATURATION: 98 % | BODY MASS INDEX: 50.02 KG/M2 | HEIGHT: 64 IN | RESPIRATION RATE: 16 BRPM | HEART RATE: 85 BPM | WEIGHT: 293 LBS | DIASTOLIC BLOOD PRESSURE: 79 MMHG | TEMPERATURE: 98 F | SYSTOLIC BLOOD PRESSURE: 120 MMHG

## 2025-06-11 DIAGNOSIS — Z00.00 ANNUAL PHYSICAL EXAM: Primary | ICD-10-CM

## 2025-06-11 DIAGNOSIS — Z87.81 STATUS POST OPEN REDUCTION WITH INTERNAL FIXATION (ORIF) OF FRACTURE OF ANKLE: ICD-10-CM

## 2025-06-11 DIAGNOSIS — F32.9 MAJOR DEPRESSIVE DISORDER, REMISSION STATUS UNSPECIFIED, UNSPECIFIED WHETHER RECURRENT: ICD-10-CM

## 2025-06-11 DIAGNOSIS — F33.1 MODERATE RECURRENT MAJOR DEPRESSION (H): ICD-10-CM

## 2025-06-11 DIAGNOSIS — Z98.890 STATUS POST OPEN REDUCTION WITH INTERNAL FIXATION (ORIF) OF FRACTURE OF ANKLE: ICD-10-CM

## 2025-06-11 DIAGNOSIS — E66.813 CLASS 3 SEVERE OBESITY DUE TO EXCESS CALORIES WITHOUT SERIOUS COMORBIDITY WITH BODY MASS INDEX (BMI) OF 50.0 TO 59.9 IN ADULT (H): ICD-10-CM

## 2025-06-11 DIAGNOSIS — F90.9 ATTENTION DEFICIT HYPERACTIVITY DISORDER (ADHD), UNSPECIFIED ADHD TYPE: ICD-10-CM

## 2025-06-11 PROCEDURE — 1125F AMNT PAIN NOTED PAIN PRSNT: CPT | Performed by: PHYSICIAN ASSISTANT

## 2025-06-11 PROCEDURE — 99395 PREV VISIT EST AGE 18-39: CPT | Performed by: PHYSICIAN ASSISTANT

## 2025-06-11 PROCEDURE — 99213 OFFICE O/P EST LOW 20 MIN: CPT | Mod: 25 | Performed by: PHYSICIAN ASSISTANT

## 2025-06-11 PROCEDURE — G2211 COMPLEX E/M VISIT ADD ON: HCPCS | Performed by: PHYSICIAN ASSISTANT

## 2025-06-11 PROCEDURE — 3074F SYST BP LT 130 MM HG: CPT | Performed by: PHYSICIAN ASSISTANT

## 2025-06-11 PROCEDURE — 3078F DIAST BP <80 MM HG: CPT | Performed by: PHYSICIAN ASSISTANT

## 2025-06-11 RX ORDER — BUPROPION HYDROCHLORIDE 300 MG/1
300 TABLET ORAL
Qty: 90 TABLET | Refills: 3 | Status: SHIPPED | OUTPATIENT
Start: 2025-06-11

## 2025-06-11 ASSESSMENT — PAIN SCALES - GENERAL: PAINLEVEL_OUTOF10: SEVERE PAIN (7)

## 2025-06-11 ASSESSMENT — PATIENT HEALTH QUESTIONNAIRE - PHQ9
SUM OF ALL RESPONSES TO PHQ QUESTIONS 1-9: 12
SUM OF ALL RESPONSES TO PHQ QUESTIONS 1-9: 12
10. IF YOU CHECKED OFF ANY PROBLEMS, HOW DIFFICULT HAVE THESE PROBLEMS MADE IT FOR YOU TO DO YOUR WORK, TAKE CARE OF THINGS AT HOME, OR GET ALONG WITH OTHER PEOPLE: VERY DIFFICULT

## 2025-06-11 NOTE — PROGRESS NOTES
"Preventive Care Visit  [facility] ROHIT Mari PA-C, Physician Assistant - Medical  Jun 11, 2025      Assessment & Plan     Annual physical exam  Hold off on annual labs today.  Reviewed labs from last year.  Plan to check these next year.  Healthy diet and exercise reviewed. Recommended routine dental, eye, and skin screenings.     Attention deficit hyperactivity disorder (ADHD), unspecified ADHD type  Continue Concerta.  6-month follow-up.  Virtual okay.  Tolerating without issue    Major depressive disorder, remission status unspecified, unspecified whether recurrent  Mood stable.  Continue Wellbutrin.  Continue follow-up with therapy team.  - buPROPion (WELLBUTRIN XL) 300 MG 24 hr tablet  Dispense: 90 tablet; Refill: 3    Status post open reduction with internal fixation (ORIF) of fracture of ankle  Continue follow-up with orthopedic team.  Signed handicap sticker which I think is very reasonable for her to use as needed for when her ankle is very bothering her or has a long distance to walk.    Obesity  Healthy diet, exercise.    Patient has been advised of split billing requirements and indicates understanding: Yes        BMI  Estimated body mass index is 56.04 kg/m  as calculated from the following:    Height as of this encounter: 1.631 m (5' 4.21\").    Weight as of this encounter: 149.1 kg (328 lb 9.6 oz).   Weight management plan: Discussed healthy diet and exercise guidelines    Depression Screening Follow Up        6/11/2025     7:37 AM   PHQ   PHQ-9 Total Score 12    Q9: Thoughts of better off dead/self-harm past 2 weeks Several days   F/U: Thoughts of suicide or self-harm No   F/U: Safety concerns No       Patient-reported         6/11/2025     7:37 AM   Last PHQ-9   1.  Little interest or pleasure in doing things 2   2.  Feeling down, depressed, or hopeless 2   3.  Trouble falling or staying asleep, or sleeping too much 1   4.  Feeling tired or having little energy 2   5.  " Poor appetite or overeating 1   6.  Feeling bad about yourself 1   7.  Trouble concentrating 1   8.  Moving slowly or restless 1   Q9: Thoughts of better off dead/self-harm past 2 weeks 1   PHQ-9 Total Score 12    In the past two weeks have you had thoughts of suicide or self harm? No   Do you have concerns about your personal safety or the safety of others? No       Patient-reported                 Follow Up Actions Taken  Crisis resource information provided in the After Visit Summary    Discussed the following ways the patient can remain in a safe environment:      Counseling  Appropriate preventive services were addressed with this patient via screening, questionnaire, or discussion as appropriate for fall prevention, nutrition, physical activity, Tobacco-use cessation, social engagement, weight loss and cognition.  Checklist reviewing preventive services available has been given to the patient.  Reviewed patient's diet, addressing concerns and/or questions.   She is at risk for lack of exercise and has been provided with information to increase physical activity for the benefit of her well-being.   She is at risk for psychosocial distress and has been provided with information to reduce risk.   The patient's PHQ-9 score is consistent with moderate depression. She was provided with information regarding depression.         Connie Cobian is a pleasant 25 year old, presenting for the following:  Physical and Recheck Medication    Here today for annual physical   overall doing well.  Works in the [employer] industry    No major concerns today about her health.  Ongoing chronic left ankle pain following ORIF of left ankle back in 2023.  Following with [facility].  Gets a cortisone shot every 6 months which is very helpful however when this shot is wearing off she notes significant pain especially when walking longer distances.  Wondering if it be possible for her to get a handicap sticker for these  situations.    Stable on Wellbutrin for her mental health.  Follows with [facility].  Has a good relationship with her therapist.    Stable on Concerta for her ADHD.  Tolerated medication without side effects.  Up-to-date on Pap.        6/11/2025     7:45 AM   Additional Questions   Roomed by Lin SMITH MA          Advance Care Planning    Discussed advance care planning with patient; however, patient declined at this time.        6/6/2025   General Health   How would you rate your overall physical health? (!) POOR   Feel stress (tense, anxious, or unable to sleep) Very much   (!) STRESS CONCERN      6/6/2025   Nutrition   Three or more servings of calcium each day? Yes   Diet: Regular (no restrictions)   How many servings of fruit and vegetables per day? (!) 2-3   How many sweetened beverages each day? 0-1         6/6/2025   Exercise   Days per week of moderate/strenous exercise 1 day   Average minutes spent exercising at this level 70 min   (!) EXERCISE CONCERN      6/6/2025   Social Factors   Frequency of gathering with friends or relatives Three times a week   Worry food won't last until get money to buy more No   Food not last or not have enough money for food? No   Do you have housing? (Housing is defined as stable permanent housing and does not include staying outside in a car, in a tent, in an abandoned building, in an overnight shelter, or couch-surfing.) Yes   Are you worried about losing your housing? No   Lack of transportation? No   Unable to get utilities (heat,electricity)? No         6/6/2025   Dental   Dentist two times every year? Yes       Today's PHQ-9 Score:       6/11/2025     7:37 AM   PHQ-9 SCORE   PHQ-9 Total Score MyChart 12 (Moderate depression)   PHQ-9 Total Score 12        Patient-reported         6/6/2025   Substance Use   Alcohol more than 3/day or more than 7/wk No   Do you use any other substances recreationally? (!) CANNABIS PRODUCTS     Social History     Tobacco Use     Smoking status: Never    Smokeless tobacco: Never   Vaping Use    Vaping status: Never Used   Substance Use Topics    Alcohol use: Yes     Comment: rare    Drug use: Yes     Types: Marijuana     Comment: Very rare usage. 1-2 times a year.                6/6/2025   STI Screening   New sexual partner(s) since last STI/HIV test? No     History of abnormal Pap smear:         3/4/2024     5:52 PM   PAP / HPV   PAP Negative for Intraepithelial Lesion or Malignancy (NILM)            6/6/2025   Contraception/Family Planning   Questions about contraception or family planning No        Reviewed and updated as needed this visit by Provider    Allergies  Meds                Past Medical History:   Diagnosis Date    Depression     Depressive disorder 01/01/2011    Rough estimate on general depression diagnosis     Past Surgical History:   Procedure Laterality Date    OPEN REDUCTION INTERNAL FIXATION ANKLE Left 11/30/2023    Procedure: Left ankle Open reduction internal fixation;  Surgeon: Adalid Torres MD;  Location: [facility] OR    WRIST ARTHROPLASTY       OB History   No obstetric history on file.     Lab work is in process  Labs reviewed in EPIC  BP Readings from Last 3 Encounters:   06/11/25 120/79   06/05/24 117/80   05/31/24 (!) 132/105    Wt Readings from Last 3 Encounters:   06/11/25 (!) 149.1 kg (328 lb 9.6 oz)   06/05/24 143.3 kg (316 lb)   05/31/24 144.8 kg (319 lb 3.6 oz)                  Patient Active Problem List   Diagnosis    Status post open reduction with internal fixation (ORIF) of fracture of ankle    Attention deficit hyperactivity disorder (ADHD), unspecified ADHD type    Major depressive disorder, remission status unspecified, unspecified whether recurrent     Past Surgical History:   Procedure Laterality Date    OPEN REDUCTION INTERNAL FIXATION ANKLE Left 11/30/2023    Procedure: Left ankle Open reduction internal fixation;  Surgeon: Adalid Torres MD;  Location: [facility] OR    WRIST ARTHROPLASTY        "  Social History     Tobacco Use    Smoking status: Never    Smokeless tobacco: Never   Substance Use Topics    Alcohol use: Yes     Comment: rare     Family History   Problem Relation Age of Onset    Depression Mother     Mental Illness Mother         Bi-Polar disorder    Thyroid Disease Mother     Obesity Mother     Hypertension Father     Hyperlipidemia Father     Obesity Father          Current Outpatient Medications   Medication Sig Dispense Refill    acetaminophen (TYLENOL) 500 MG tablet Take 500-1,000 mg by mouth every 6 hours as needed for mild pain      buPROPion (WELLBUTRIN XL) 300 MG 24 hr tablet Take 1 tablet (300 mg) by mouth daily at 2 pm. 90 tablet 3    levonorgestrel (MIRENA) 52 MG (20 mcg/day) IUD 1 each by Intrauterine route once      methylphenidate HCl ER, OSM, (CONCERTA) 54 MG CR tablet Take 1 tablet (54 mg) by mouth daily. 30 tablet 0     Allergies   Allergen Reactions    Amoxicillin Itching     Recent Labs   Lab Test 06/05/24  0753 05/31/24  2336 12/03/23  1059 11/21/23  0802   LDL 80  --   --   --    HDL 55  --   --   --    TRIG 60  --   --   --    CR  --  0.68 0.69 0.80   GFRESTIMATED  --  >90 >90 >90   POTASSIUM  --  4.1  --  4.6   TSH 3.41  --   --   --           Review of Systems  Constitutional, neuro, ENT, endocrine, pulmonary, cardiac, gastrointestinal, genitourinary, musculoskeletal, integument and psychiatric systems are negative, except as otherwise noted.     Objective    Exam  /79 (BP Location: Right arm, Patient Position: Sitting, Cuff Size: Adult Large)   Pulse 85   Temp 98  F (36.7  C) (Temporal)   Resp 16   Ht 1.631 m (5' 4.21\")   Wt (!) 149.1 kg (328 lb 9.6 oz)   SpO2 98%   BMI 56.04 kg/m     Estimated body mass index is 56.04 kg/m  as calculated from the following:    Height as of this encounter: 1.631 m (5' 4.21\").    Weight as of this encounter: 149.1 kg (328 lb 9.6 oz).    Physical Exam  GENERAL: alert and no distress  EYES: Eyes grossly normal to inspection, " PERRL and conjunctivae and sclerae normal  HENT: ear canals and TM's normal, nose and mouth without ulcers or lesions  NECK: no adenopathy, no asymmetry, masses, or scars  RESP: lungs clear to auscultation - no rales, rhonchi or wheezes  CV: regular rate and rhythm, normal S1 S2, no S3 or S4, no murmur, click or rub, no peripheral edema  ABDOMEN: soft, nontender, no hepatosplenomegaly, no masses and bowel sounds normal  MS: no gross musculoskeletal defects noted, no edema  SKIN: no suspicious lesions or rashes  NEURO: Normal strength and tone, mentation intact and speech normal  PSYCH: mentation appears normal, affect normal/bright    Signed Electronically by: Jose M Mari PA-C

## 2025-07-21 ENCOUNTER — MYC REFILL (OUTPATIENT)
Dept: FAMILY MEDICINE | Facility: CLINIC | Age: 25
End: 2025-07-21
Payer: COMMERCIAL

## 2025-07-21 DIAGNOSIS — F90.9 ATTENTION DEFICIT HYPERACTIVITY DISORDER (ADHD), UNSPECIFIED ADHD TYPE: ICD-10-CM

## 2025-07-22 RX ORDER — METHYLPHENIDATE HYDROCHLORIDE 54 MG/1
54 TABLET ORAL DAILY
Qty: 30 TABLET | Refills: 0 | Status: SHIPPED | OUTPATIENT
Start: 2025-07-22

## (undated) DEVICE — SUCTION CANISTER MEDIVAC LINER 3000ML W/LID 65651-530

## (undated) DEVICE — BNDG ELASTIC 6" DBL LENGTH UNSTERILE 6611-16

## (undated) DEVICE — BAG CLEAR TRASH 1.3M 39X33" P4040C

## (undated) DEVICE — ESU PENCIL W/HOLSTER E2350H

## (undated) DEVICE — DRAPE C-ARMOR 5 SIDED 5523

## (undated) DEVICE — ESU GROUND PAD UNIVERSAL W/O CORD

## (undated) DEVICE — ESU GROUND PAD ADULT W/CORD E7507

## (undated) DEVICE — TOURNIQUET SGL  BLADDER 30"X4" BLUE 5921030135

## (undated) DEVICE — LINEN ORTHO ACL PACK 5447

## (undated) DEVICE — CAST PLASTER SPLINT XTRA FAST 5X30" 7392

## (undated) DEVICE — LINEN HALF SHEET 5512

## (undated) DEVICE — DRILL BIT SYN QUICK COUPLING 2.0X140MM  323.062

## (undated) DEVICE — GUIDE WIRE 1.25X150MM NON THRD  900.721

## (undated) DEVICE — PREP CHLORAPREP 26ML TINTED HI-LITE ORANGE 930815

## (undated) DEVICE — SU ETHILON 3-0 FS-1 18" 669H

## (undated) DEVICE — LINEN FULL SHEET 5511

## (undated) DEVICE — SU ETHILON 3-0 FS-1 18" 663G

## (undated) DEVICE — DRILL BIT QUICK COUPLING CANN 2.7MM 310.67

## (undated) DEVICE — CAST PADDING 4" UNSTERILE 9044

## (undated) DEVICE — TUBING SUCTION 12"X1/4" N612

## (undated) DEVICE — GLOVE BIOGEL PI MICRO SZ 6.5 48565

## (undated) DEVICE — DRSG GAUZE 4X4" TRAY

## (undated) DEVICE — SPONGE LAP 18X18" X8435

## (undated) DEVICE — DRSG XEROFORM 1X8"

## (undated) DEVICE — DRILL BIT MINI 100MM QC 2.7MM  310.26

## (undated) DEVICE — DRAPE IOBAN INCISE 36X23" 6651EZ

## (undated) DEVICE — SOL WATER IRRIG 1000ML BOTTLE 2F7114

## (undated) DEVICE — GLOVE BIOGEL PI MICRO INDICATOR UNDERGLOVE SZ 7.5 48975

## (undated) DEVICE — DRAPE IOBAN INCISE 23X17" 6650EZ

## (undated) DEVICE — Device

## (undated) DEVICE — GLOVE BIOGEL PI MICRO INDICATOR UNDERGLOVE SZ 6.5 48965

## (undated) DEVICE — CAST PADDING 4" STERILE 9044S

## (undated) DEVICE — PACK LOWER EXTREMITY RIDGES

## (undated) DEVICE — SU VICRYL 2-0 CT-1 27" UND J259H

## (undated) DEVICE — SUCTION TIP YANKAUER STR K87

## (undated) DEVICE — GLOVE BIOGEL PI MICRO SZ 7.5 48575

## (undated) DEVICE — IMPLANTABLE DEVICE: Type: IMPLANTABLE DEVICE | Site: ANKLE | Status: NON-FUNCTIONAL

## (undated) RX ORDER — FENTANYL CITRATE 0.05 MG/ML
INJECTION, SOLUTION INTRAMUSCULAR; INTRAVENOUS
Status: DISPENSED
Start: 2023-11-30

## (undated) RX ORDER — HYDROMORPHONE HCL IN WATER/PF 6 MG/30 ML
PATIENT CONTROLLED ANALGESIA SYRINGE INTRAVENOUS
Status: DISPENSED
Start: 2023-11-30

## (undated) RX ORDER — CEFAZOLIN SODIUM 1 G/3ML
INJECTION, POWDER, FOR SOLUTION INTRAMUSCULAR; INTRAVENOUS
Status: DISPENSED
Start: 2023-11-30

## (undated) RX ORDER — HYDROMORPHONE HYDROCHLORIDE 1 MG/ML
INJECTION, SOLUTION INTRAMUSCULAR; INTRAVENOUS; SUBCUTANEOUS
Status: DISPENSED
Start: 2023-11-30

## (undated) RX ORDER — ONDANSETRON 2 MG/ML
INJECTION INTRAMUSCULAR; INTRAVENOUS
Status: DISPENSED
Start: 2023-11-30

## (undated) RX ORDER — KETOROLAC TROMETHAMINE 30 MG/ML
INJECTION, SOLUTION INTRAMUSCULAR; INTRAVENOUS
Status: DISPENSED
Start: 2023-11-30

## (undated) RX ORDER — PROPOFOL 10 MG/ML
INJECTION, EMULSION INTRAVENOUS
Status: DISPENSED
Start: 2023-11-30

## (undated) RX ORDER — FENTANYL CITRATE 50 UG/ML
INJECTION, SOLUTION INTRAMUSCULAR; INTRAVENOUS
Status: DISPENSED
Start: 2023-11-30

## (undated) RX ORDER — TRANEXAMIC ACID 650 MG/1
TABLET ORAL
Status: DISPENSED
Start: 2023-11-30

## (undated) RX ORDER — BUPIVACAINE HYDROCHLORIDE 5 MG/ML
INJECTION, SOLUTION EPIDURAL; INTRACAUDAL
Status: DISPENSED
Start: 2023-11-30

## (undated) RX ORDER — DEXAMETHASONE SODIUM PHOSPHATE 4 MG/ML
INJECTION, SOLUTION INTRA-ARTICULAR; INTRALESIONAL; INTRAMUSCULAR; INTRAVENOUS; SOFT TISSUE
Status: DISPENSED
Start: 2023-11-30

## (undated) RX ORDER — ACETAMINOPHEN 325 MG/1
TABLET ORAL
Status: DISPENSED
Start: 2023-11-30